# Patient Record
Sex: FEMALE | Race: ASIAN | NOT HISPANIC OR LATINO
[De-identification: names, ages, dates, MRNs, and addresses within clinical notes are randomized per-mention and may not be internally consistent; named-entity substitution may affect disease eponyms.]

---

## 2021-01-14 ENCOUNTER — APPOINTMENT (OUTPATIENT)
Dept: RHEUMATOLOGY | Facility: CLINIC | Age: 61
End: 2021-01-14

## 2021-01-20 ENCOUNTER — APPOINTMENT (OUTPATIENT)
Dept: UROLOGY | Facility: CLINIC | Age: 61
End: 2021-01-20

## 2021-04-06 ENCOUNTER — APPOINTMENT (OUTPATIENT)
Dept: RHEUMATOLOGY | Facility: CLINIC | Age: 61
End: 2021-04-06
Payer: COMMERCIAL

## 2021-04-06 VITALS
WEIGHT: 160.7 LBS | DIASTOLIC BLOOD PRESSURE: 71 MMHG | TEMPERATURE: 98.9 F | SYSTOLIC BLOOD PRESSURE: 110 MMHG | OXYGEN SATURATION: 99 % | BODY MASS INDEX: 28.47 KG/M2 | HEIGHT: 62.8 IN | HEART RATE: 98 BPM

## 2021-04-06 DIAGNOSIS — M25.50 PAIN IN UNSPECIFIED JOINT: ICD-10-CM

## 2021-04-06 DIAGNOSIS — Z87.39 PERSONAL HISTORY OF OTHER DISEASES OF THE MUSCULOSKELETAL SYSTEM AND CONNECTIVE TISSUE: ICD-10-CM

## 2021-04-06 PROCEDURE — 99072 ADDL SUPL MATRL&STAF TM PHE: CPT

## 2021-04-06 PROCEDURE — 36415 COLL VENOUS BLD VENIPUNCTURE: CPT

## 2021-04-06 PROCEDURE — 99204 OFFICE O/P NEW MOD 45 MIN: CPT | Mod: 25

## 2021-04-06 RX ORDER — METFORMIN HYDROCHLORIDE 500 MG/1
500 TABLET, COATED ORAL
Refills: 0 | Status: ACTIVE | COMMUNITY

## 2021-04-06 RX ORDER — AMLODIPINE BESYLATE 5 MG/1
5 TABLET ORAL
Refills: 0 | Status: ACTIVE | COMMUNITY

## 2021-04-06 RX ORDER — GLIPIZIDE 2.5 MG/1
TABLET ORAL
Refills: 0 | Status: ACTIVE | COMMUNITY

## 2021-04-06 RX ORDER — ATORVASTATIN CALCIUM 20 MG/1
20 TABLET, FILM COATED ORAL
Refills: 0 | Status: ACTIVE | COMMUNITY

## 2021-04-06 RX ORDER — ASPIRIN 325 MG/1
TABLET, FILM COATED ORAL
Refills: 0 | Status: ACTIVE | COMMUNITY

## 2021-04-06 RX ORDER — ENALAPRIL MALEATE 10 MG/1
10 TABLET ORAL
Refills: 0 | Status: ACTIVE | COMMUNITY

## 2021-04-06 NOTE — HISTORY OF PRESENT ILLNESS
[FreeTextEntry1] : 59 yo W, presenting for evaluation of joint pain. \par \par \par -diagnosed with RA by PMD based on blood work over 10 years ago \par she initially did not have any pain  except occasionally \par she was seeing an acupuncturist and was managing without medications\par \par \par over te past year\par she started having pain at the wrists and knuckles \par as well as knees and bottom of feet\par the pain was severe affecting her daily activities\par pain is worse in the morning \par associated with morning stiffness for few hours\par reported occasional swelling of the hands\par pain at the feet in the morning with first few steps\par Tylenol helps with pain\par \par -history of remote left ankle fracture \par \par Rheumatologic ROS:\par - No alopecia\par - No dry eyes or mouth\par - No history of red/painful eye\par - No oral ulcers\par - No reflux or dysphagia\par - No Raynaud's \par - No rashes or photosensitivity\par - No miscarriages or pregnancy complications\par - No history of blood clots\par -family history of auto-immune disease: father with RA \par \par \par

## 2021-04-06 NOTE — ASSESSMENT
[FreeTextEntry1] : #Arthralgias of hands/wrists and ankles/feet worsening since last year\par evidence of synovitis on exam concerning for inflammatory arthritis\par #history of RA without treatment\par \par \par Recommend:\par -obtain blood work today to assess for inflammation\par -check RF/CCP as well as screening tests\par -Obtain X-rays of bilateral hands/wrists and ankles/feet\par -obtain MSK US of hands/wrists \par -discussed starting steroids and likely MTX after above\par -flu and COVID vaccine completed \par \par RTO in 2 weeks\par \par \par Patient aware of my assessment and plan, all questions answered.\par

## 2021-04-06 NOTE — PHYSICAL EXAM
[General Appearance - Alert] : alert [General Appearance - In No Acute Distress] : in no acute distress [Sclera] : the sclera and conjunctiva were normal [Respiration, Rhythm And Depth] : normal respiratory rhythm and effort [FreeTextEntry1] : tenderness and synovitis at bilateral wrists, MCPs and PIPs, tenderness to bilateral ankles [Oriented To Time, Place, And Person] : oriented to person, place, and time

## 2021-04-09 ENCOUNTER — OUTPATIENT (OUTPATIENT)
Dept: OUTPATIENT SERVICES | Facility: HOSPITAL | Age: 61
LOS: 1 days | End: 2021-04-09
Payer: COMMERCIAL

## 2021-04-09 ENCOUNTER — APPOINTMENT (OUTPATIENT)
Dept: RADIOLOGY | Facility: IMAGING CENTER | Age: 61
End: 2021-04-09
Payer: COMMERCIAL

## 2021-04-09 ENCOUNTER — RESULT REVIEW (OUTPATIENT)
Age: 61
End: 2021-04-09

## 2021-04-09 DIAGNOSIS — Z87.39 PERSONAL HISTORY OF OTHER DISEASES OF THE MUSCULOSKELETAL SYSTEM AND CONNECTIVE TISSUE: ICD-10-CM

## 2021-04-09 PROCEDURE — 73130 X-RAY EXAM OF HAND: CPT | Mod: 26,50

## 2021-04-09 PROCEDURE — 73110 X-RAY EXAM OF WRIST: CPT

## 2021-04-09 PROCEDURE — 73610 X-RAY EXAM OF ANKLE: CPT | Mod: 26,50

## 2021-04-09 PROCEDURE — 73620 X-RAY EXAM OF FOOT: CPT

## 2021-04-09 PROCEDURE — 73110 X-RAY EXAM OF WRIST: CPT | Mod: 26,50

## 2021-04-09 PROCEDURE — 73130 X-RAY EXAM OF HAND: CPT

## 2021-04-09 PROCEDURE — 73620 X-RAY EXAM OF FOOT: CPT | Mod: 26,50

## 2021-04-09 PROCEDURE — 73610 X-RAY EXAM OF ANKLE: CPT

## 2021-04-15 ENCOUNTER — APPOINTMENT (OUTPATIENT)
Dept: ULTRASOUND IMAGING | Facility: CLINIC | Age: 61
End: 2021-04-15
Payer: COMMERCIAL

## 2021-04-15 ENCOUNTER — OUTPATIENT (OUTPATIENT)
Dept: OUTPATIENT SERVICES | Facility: HOSPITAL | Age: 61
LOS: 1 days | End: 2021-04-15
Payer: COMMERCIAL

## 2021-04-15 ENCOUNTER — RESULT REVIEW (OUTPATIENT)
Age: 61
End: 2021-04-15

## 2021-04-15 DIAGNOSIS — Z87.39 PERSONAL HISTORY OF OTHER DISEASES OF THE MUSCULOSKELETAL SYSTEM AND CONNECTIVE TISSUE: ICD-10-CM

## 2021-04-15 PROCEDURE — 76881 US COMPL JOINT R-T W/IMG: CPT | Mod: 26,RT

## 2021-04-15 PROCEDURE — 76881 US COMPL JOINT R-T W/IMG: CPT

## 2021-04-20 ENCOUNTER — APPOINTMENT (OUTPATIENT)
Dept: RHEUMATOLOGY | Facility: CLINIC | Age: 61
End: 2021-04-20
Payer: COMMERCIAL

## 2021-04-20 VITALS
HEIGHT: 62.8 IN | DIASTOLIC BLOOD PRESSURE: 80 MMHG | BODY MASS INDEX: 28.35 KG/M2 | TEMPERATURE: 98.6 F | OXYGEN SATURATION: 99 % | WEIGHT: 160 LBS | SYSTOLIC BLOOD PRESSURE: 128 MMHG | HEART RATE: 99 BPM

## 2021-04-20 PROCEDURE — 99072 ADDL SUPL MATRL&STAF TM PHE: CPT

## 2021-04-20 PROCEDURE — 99215 OFFICE O/P EST HI 40 MIN: CPT

## 2021-04-20 NOTE — HISTORY OF PRESENT ILLNESS
[FreeTextEntry1] : Initial history:\par \par \par -diagnosed with RA by PMD based on blood work over 10 years ago \par she initially did not have any pain except occasionally \par she was seeing an acupuncturist and was managing without medications\par \par \par over te past year\par she started having pain at the wrists and knuckles \par as well as knees and bottom of feet\par the pain was severe affecting her daily activities\par pain is worse in the morning \par associated with morning stiffness for few hours\par reported occasional swelling of the hands\par pain at the feet in the morning with first few steps\par Tylenol helps with pain\par \par -history of remote left ankle fracture \par \par Rheumatologic ROS:\par - No alopecia\par - No dry eyes or mouth\par - No history of red/painful eye\par - No oral ulcers\par - No reflux or dysphagia\par - No Raynaud's \par - No rashes or photosensitivity\par - No miscarriages or pregnancy complications\par - No history of blood clots\par -family history of auto-immune disease: father with RA \par \par

## 2021-04-20 NOTE — ASSESSMENT
[FreeTextEntry1] : Blood work and X-rays done after last visit reviewed today with patient\par \par #Seropositive erosive RA\par -symptoms present for over 10 years but declined therapy\par -CCP >250, \par -ESR/CRP elevated\par -X-rays hands/wrists 4/9/21: erosions right 4th/left fifth PIP, left 1st MC\par -X-rays feet/ankles 4/9/21: erosions bilateral 5th and right 3rd/4th MT heads, bilateral 5th PP\par \par =Discussed with patient the diagnosis/prognosis/treatment options\par she is now agreeable to start therapy\par risks/benefits/alternatives/side effects reviewed with patient at length\par Printed information from the [employer] provided to patient for review \par discussed the need to start biologics early given the erosive disease \par \par =start prednisone 20 mg daily for one week then 15 mg daily\par =start MTX 10 mg/ weekly\par =start folic acid daily \par \par \par #Screening tests:\par hep panel, QTB negative 4/6/21\par #Vaccination:\par -FLu received 2020\par -needs PCV 13\par -COVID 19 Pfizer 2/27 and 3/20 \par \par #Bone health\par .normal Vitamin D \par .DEXA scan as baseline\par \par #TAYA 1:80\par will complete serology at next visit\par \par RTO in 2 weeks\par \par \par Patient aware of my assessment and plan, all questions answered.\par \par \par

## 2021-04-20 NOTE — PHYSICAL EXAM
[General Appearance - Alert] : alert [General Appearance - In No Acute Distress] : in no acute distress [Sclera] : the sclera and conjunctiva were normal [Auscultation Breath Sounds / Voice Sounds] : lungs were clear to auscultation bilaterally [Heart Sounds] : normal S1 and S2 [FreeTextEntry1] : tenderness and synovitis at bilateral wrists, PIPs, left knee and left ankle [Oriented To Time, Place, And Person] : oriented to person, place, and time

## 2021-04-21 LAB
25(OH)D3 SERPL-MCNC: 55 NG/ML
ALBUMIN SERPL ELPH-MCNC: 4.2 G/DL
ALP BLD-CCNC: 86 U/L
ALT SERPL-CCNC: 24 U/L
ANA PAT FLD IF-IMP: ABNORMAL
ANA PATTERN: ABNORMAL
ANA SER IF-ACNC: ABNORMAL
ANA TITER: ABNORMAL
ANION GAP SERPL CALC-SCNC: 13 MMOL/L
AST SERPL-CCNC: 20 U/L
BASOPHILS # BLD AUTO: 0.03 K/UL
BASOPHILS NFR BLD AUTO: 0.3 %
BILIRUB SERPL-MCNC: 0.2 MG/DL
BUN SERPL-MCNC: 17 MG/DL
CALCIUM SERPL-MCNC: 9.9 MG/DL
CCP AB SER IA-ACNC: >250 UNITS
CHLORIDE SERPL-SCNC: 100 MMOL/L
CO2 SERPL-SCNC: 26 MMOL/L
CREAT SERPL-MCNC: 0.62 MG/DL
CRP SERPL-MCNC: 41 MG/L
ENA SS-A AB SER IA-ACNC: <0.2 AL
ENA SS-B AB SER IA-ACNC: <0.2 AL
EOSINOPHIL # BLD AUTO: 0.24 K/UL
EOSINOPHIL NFR BLD AUTO: 2.7 %
ERYTHROCYTE [SEDIMENTATION RATE] IN BLOOD BY WESTERGREN METHOD: 70 MM/HR
G6PD SER-CCNC: 21.9 U/G HGB
GLUCOSE SERPL-MCNC: 166 MG/DL
HBV CORE IGG+IGM SER QL: NONREACTIVE
HBV SURFACE AG SER QL: NONREACTIVE
HCT VFR BLD CALC: 39.1 %
HCV AB SER QL: NONREACTIVE
HCV S/CO RATIO: 0.11 S/CO
HGB BLD-MCNC: 11.9 G/DL
IMM GRANULOCYTES NFR BLD AUTO: 0.3 %
LYMPHOCYTES # BLD AUTO: 1.41 K/UL
LYMPHOCYTES NFR BLD AUTO: 16 %
M TB IFN-G BLD-IMP: NEGATIVE
MAN DIFF?: NORMAL
MCHC RBC-ENTMCNC: 27.7 PG
MCHC RBC-ENTMCNC: 30.4 GM/DL
MCV RBC AUTO: 90.9 FL
MONOCYTES # BLD AUTO: 0.46 K/UL
MONOCYTES NFR BLD AUTO: 5.2 %
NEUTROPHILS # BLD AUTO: 6.62 K/UL
NEUTROPHILS NFR BLD AUTO: 75.5 %
PLATELET # BLD AUTO: 495 K/UL
POTASSIUM SERPL-SCNC: 5 MMOL/L
PROT SERPL-MCNC: 8.4 G/DL
QUANTIFERON TB PLUS MITOGEN MINUS NIL: 2.22 IU/ML
QUANTIFERON TB PLUS NIL: 0.07 IU/ML
QUANTIFERON TB PLUS TB1 MINUS NIL: 0 IU/ML
QUANTIFERON TB PLUS TB2 MINUS NIL: -0.02 IU/ML
RBC # BLD: 4.3 M/UL
RBC # FLD: 14.6 %
RF+CCP IGG SER-IMP: ABNORMAL
RHEUMATOID FACT SER QL: 172 IU/ML
SODIUM SERPL-SCNC: 139 MMOL/L
WBC # FLD AUTO: 8.79 K/UL

## 2021-05-04 ENCOUNTER — APPOINTMENT (OUTPATIENT)
Dept: RHEUMATOLOGY | Facility: CLINIC | Age: 61
End: 2021-05-04
Payer: COMMERCIAL

## 2021-05-04 VITALS
WEIGHT: 162.46 LBS | HEIGHT: 62.99 IN | SYSTOLIC BLOOD PRESSURE: 127 MMHG | DIASTOLIC BLOOD PRESSURE: 74 MMHG | OXYGEN SATURATION: 99 % | HEART RATE: 84 BPM | TEMPERATURE: 98.4 F | BODY MASS INDEX: 28.79 KG/M2

## 2021-05-04 PROCEDURE — 99072 ADDL SUPL MATRL&STAF TM PHE: CPT

## 2021-05-04 PROCEDURE — 36415 COLL VENOUS BLD VENIPUNCTURE: CPT

## 2021-05-04 PROCEDURE — 99214 OFFICE O/P EST MOD 30 MIN: CPT | Mod: 25

## 2021-05-04 NOTE — HISTORY OF PRESENT ILLNESS
[de-identified] : today's visit  [FreeTextEntry1] : -reports feeling 100% better, the joint pain and swelling have resolved\par -tolerating the medications well no issues

## 2021-05-04 NOTE — ASSESSMENT
[FreeTextEntry1] : #Seropositive erosive RA\par -symptoms present for over 10 years but declined therapy\par -CCP >250, \par -ESR/CRP elevated\par -X-rays hands/wrists 4/9/21: erosions right 4th/left fifth PIP, left 1st MC\par -X-rays feet/ankles 4/9/21: erosions bilateral 5th and right 3rd/4th MT heads, bilateral 5th PP\par ~4/20/21: started prednisone bridge therapy and MTX\par Today in remission \par \par Recommend:\par -obtain monitoring labs today \par -lower prednisone to 10 mg daily \par -increase MTX to 15 mg /week for 2 weeks \par -continue daily folic acid \par \par #Screening tests:\par hep panel, QTB negative 4/6/21\par \par #Vaccination:\par -FLu received 2020\par -needs PCV 13 at next visit \par -COVID 19 Pfizer 2/27 and 3/20 \par \par #Bone health\par .normal Vitamin D \par .DEXA scan as baseline (to schedule)\par \par #TAYA 1:80\par will complete serology today \par \par RTO in 2 weeks\par \par \par Patient aware of my assessment and plan, all questions answered. \par

## 2021-05-05 LAB
ALBUMIN SERPL ELPH-MCNC: 4.2 G/DL
ALP BLD-CCNC: 71 U/L
ALT SERPL-CCNC: 17 U/L
ANION GAP SERPL CALC-SCNC: 15 MMOL/L
APPEARANCE: CLEAR
AST SERPL-CCNC: 18 U/L
B2 GLYCOPROT1 AB SER QL: NEGATIVE
BACTERIA: NEGATIVE
BASOPHILS # BLD AUTO: 0.05 K/UL
BASOPHILS NFR BLD AUTO: 0.4 %
BILIRUB SERPL-MCNC: 0.2 MG/DL
BILIRUBIN URINE: NEGATIVE
BLOOD URINE: NEGATIVE
BUN SERPL-MCNC: 19 MG/DL
C3 SERPL-MCNC: 120 MG/DL
C4 SERPL-MCNC: 26 MG/DL
CALCIUM SERPL-MCNC: 9.2 MG/DL
CHLORIDE SERPL-SCNC: 100 MMOL/L
CO2 SERPL-SCNC: 24 MMOL/L
COLOR: NORMAL
CONFIRM: 26.8 SEC
CREAT SERPL-MCNC: 0.59 MG/DL
CREAT SPEC-SCNC: 78 MG/DL
CREAT/PROT UR: 0.1 RATIO
DRVVT IMM 1:2 NP PPP: NORMAL
DRVVT SCREEN TO CONFIRM RATIO: 0.96 RATIO
DSDNA AB SER-ACNC: 12 IU/ML
EOSINOPHIL # BLD AUTO: 0.1 K/UL
EOSINOPHIL NFR BLD AUTO: 0.7 %
GLUCOSE QUALITATIVE U: NEGATIVE
GLUCOSE SERPL-MCNC: 61 MG/DL
HCT VFR BLD CALC: 37.7 %
HGB BLD-MCNC: 11.6 G/DL
HYALINE CASTS: 1 /LPF
IMM GRANULOCYTES NFR BLD AUTO: 0.4 %
KETONES URINE: NEGATIVE
LEUKOCYTE ESTERASE URINE: NEGATIVE
LYMPHOCYTES # BLD AUTO: 3.39 K/UL
LYMPHOCYTES NFR BLD AUTO: 24.1 %
MAN DIFF?: NORMAL
MCHC RBC-ENTMCNC: 28.4 PG
MCHC RBC-ENTMCNC: 30.8 GM/DL
MCV RBC AUTO: 92.2 FL
MICROSCOPIC-UA: NORMAL
MONOCYTES # BLD AUTO: 0.84 K/UL
MONOCYTES NFR BLD AUTO: 6 %
NEUTROPHILS # BLD AUTO: 9.64 K/UL
NEUTROPHILS NFR BLD AUTO: 68.4 %
NITRITE URINE: NEGATIVE
PH URINE: 5.5
PLATELET # BLD AUTO: 404 K/UL
POTASSIUM SERPL-SCNC: 3.4 MMOL/L
PROT SERPL-MCNC: 7.6 G/DL
PROT UR-MCNC: 7 MG/DL
PROTEIN URINE: NEGATIVE
RBC # BLD: 4.09 M/UL
RBC # FLD: 15.8 %
RED BLOOD CELLS URINE: 4 /HPF
SCREEN DRVVT: 34.7 SEC
SILICA CLOTTING TIME INTERPRETATION: NORMAL
SILICA CLOTTING TIME S/C: 0.93 RATIO
SODIUM SERPL-SCNC: 139 MMOL/L
SPECIFIC GRAVITY URINE: 1.02
SQUAMOUS EPITHELIAL CELLS: 1 /HPF
UROBILINOGEN URINE: NORMAL
WBC # FLD AUTO: 14.08 K/UL
WHITE BLOOD CELLS URINE: 1 /HPF

## 2021-05-06 LAB
ENA RNP AB SER IA-ACNC: 0.3 AL
ENA SM AB SER IA-ACNC: <0.2 AL
ENA SS-A AB SER IA-ACNC: <0.2 AL
ENA SS-B AB SER IA-ACNC: <0.2 AL

## 2021-05-18 ENCOUNTER — APPOINTMENT (OUTPATIENT)
Dept: RHEUMATOLOGY | Facility: CLINIC | Age: 61
End: 2021-05-18
Payer: COMMERCIAL

## 2021-05-18 VITALS
OXYGEN SATURATION: 99 % | DIASTOLIC BLOOD PRESSURE: 84 MMHG | SYSTOLIC BLOOD PRESSURE: 135 MMHG | TEMPERATURE: 98 F | HEART RATE: 71 BPM

## 2021-05-18 PROCEDURE — 36415 COLL VENOUS BLD VENIPUNCTURE: CPT

## 2021-05-18 PROCEDURE — 99072 ADDL SUPL MATRL&STAF TM PHE: CPT

## 2021-05-18 PROCEDURE — 99214 OFFICE O/P EST MOD 30 MIN: CPT | Mod: 25

## 2021-05-18 NOTE — ASSESSMENT
[FreeTextEntry1] : #Seropositive erosive RA\par -symptoms present for over 10 years but declined therapy\par -CCP >250, \par -ESR/CRP elevated\par -X-rays hands/wrists 4/9/21: erosions right 4th/left fifth PIP, left 1st MC\par -X-rays feet/ankles 4/9/21: erosions bilateral 5th and right 3rd/4th MT heads, bilateral 5th PP\par ~4/20/21: started prednisone bridge therapy and MTX\par Today in remission \par Labs done at last visit reviewed today with patient \par \par Recommend:\par -obtain monitoring labs today \par -lower prednisone to 7.5 mg daily \par -increase MTX to 20 mg /week \par -continue daily folic acid \par \par #Screening tests:\par hep panel, QTB negative 4/6/21\par \par #Vaccination:\par -FLu received 2020\par -needs PCV 13 - patient continues to postpone it \par -COVID 19 Pfizer 2/27 and 3/20 \par \par #Bone health\par .normal Vitamin D \par .DEXA scan as baseline (awaiting to be faxed)\par \par #TAYA 1:80\par additional serology negative\par \par RTO in 2-3 weeks\par \par \par Patient aware of my assessment and plan, all questions answered. \par  \par

## 2021-05-18 NOTE — PHYSICAL EXAM
[General Appearance - In No Acute Distress] : in no acute distress [General Appearance - Alert] : alert [Oriented To Time, Place, And Person] : oriented to person, place, and time [FreeTextEntry1] : synovitis at right wrist non tender, no other joint pain or swelling

## 2021-05-19 LAB
ALBUMIN SERPL ELPH-MCNC: 4.2 G/DL
ALP BLD-CCNC: 74 U/L
ALT SERPL-CCNC: 18 U/L
ANION GAP SERPL CALC-SCNC: 13 MMOL/L
AST SERPL-CCNC: 17 U/L
BASOPHILS # BLD AUTO: 0.04 K/UL
BASOPHILS NFR BLD AUTO: 0.3 %
BILIRUB SERPL-MCNC: 0.3 MG/DL
BUN SERPL-MCNC: 16 MG/DL
CALCIUM SERPL-MCNC: 9.7 MG/DL
CHLORIDE SERPL-SCNC: 103 MMOL/L
CO2 SERPL-SCNC: 26 MMOL/L
CREAT SERPL-MCNC: 0.62 MG/DL
CRP SERPL-MCNC: 5 MG/L
EOSINOPHIL # BLD AUTO: 0.1 K/UL
EOSINOPHIL NFR BLD AUTO: 0.7 %
ERYTHROCYTE [SEDIMENTATION RATE] IN BLOOD BY WESTERGREN METHOD: 27 MM/HR
GLUCOSE SERPL-MCNC: 56 MG/DL
HCT VFR BLD CALC: 39.6 %
HGB BLD-MCNC: 11.9 G/DL
IMM GRANULOCYTES NFR BLD AUTO: 0.4 %
LYMPHOCYTES # BLD AUTO: 2.92 K/UL
LYMPHOCYTES NFR BLD AUTO: 21.7 %
MAN DIFF?: NORMAL
MCHC RBC-ENTMCNC: 28.6 PG
MCHC RBC-ENTMCNC: 30.1 GM/DL
MCV RBC AUTO: 95.2 FL
MONOCYTES # BLD AUTO: 0.78 K/UL
MONOCYTES NFR BLD AUTO: 5.8 %
NEUTROPHILS # BLD AUTO: 9.55 K/UL
NEUTROPHILS NFR BLD AUTO: 71.1 %
PLATELET # BLD AUTO: 361 K/UL
POTASSIUM SERPL-SCNC: 4 MMOL/L
PROT SERPL-MCNC: 7.4 G/DL
RBC # BLD: 4.16 M/UL
RBC # FLD: 17.2 %
SODIUM SERPL-SCNC: 143 MMOL/L
WBC # FLD AUTO: 13.44 K/UL

## 2021-06-15 ENCOUNTER — APPOINTMENT (OUTPATIENT)
Dept: RHEUMATOLOGY | Facility: CLINIC | Age: 61
End: 2021-06-15
Payer: COMMERCIAL

## 2021-06-15 VITALS
DIASTOLIC BLOOD PRESSURE: 85 MMHG | HEART RATE: 74 BPM | HEIGHT: 62.99 IN | BODY MASS INDEX: 28.43 KG/M2 | OXYGEN SATURATION: 97 % | WEIGHT: 160.47 LBS | SYSTOLIC BLOOD PRESSURE: 142 MMHG

## 2021-06-15 PROCEDURE — 36415 COLL VENOUS BLD VENIPUNCTURE: CPT

## 2021-06-15 PROCEDURE — 99213 OFFICE O/P EST LOW 20 MIN: CPT | Mod: 25

## 2021-06-15 NOTE — PHYSICAL EXAM
[General Appearance - Alert] : alert [General Appearance - In No Acute Distress] : in no acute distress [Respiration, Rhythm And Depth] : normal respiratory rhythm and effort [Sclera] : the sclera and conjunctiva were normal [Musculoskeletal - Swelling] : no joint swelling seen [FreeTextEntry1] : Tenderness to right ankle, no evidence of synovitis preserved range of motion, rest of joint exam normal [Oriented To Time, Place, And Person] : oriented to person, place, and time

## 2021-06-15 NOTE — ASSESSMENT
[FreeTextEntry1] : #Seropositive erosive RA\par -symptoms present for over 10 years but declined therapy\par -CCP >250, \par -ESR/CRP elevated\par -X-rays hands/wrists 4/9/21: erosions right 4th/left fifth PIP, left 1st MC\par -X-rays feet/ankles 4/9/21: erosions bilateral 5th and right 3rd/4th MT heads, bilateral 5th PP\par ~4/20/21: started prednisone bridge therapy and MTX\par \par Today in remission, CDI 6 low disease activity\par Labs done at last visit reviewed today with patient \par \par Recommend:\par -obtain monitoring labs today \par -lower prednisone to 5 mg daily for 2 weeks then 2.5 mg daily for 2 weeks then stop \par -continue MTX to 20 mg /week \par -continue daily folic acid \par \par #Screening tests:\par hep panel, QTB negative 4/6/21\par \par #Vaccination:\par -FLu received 2020\par -needs PCV 13 - patient continues to ask to postpone it for next visit \par -COVID 19 Pfizer 2/27 and 3/20 \par \par #Bone health\par .normal Vitamin D \par .DEXA scan 5/14/21: lowest T score -1.8 femoral neck\par \par #TAYA 1:80\par additional serology negative\par \par RTO in 4-5 weeks\par \par \par Patient aware of my assessment and plan, all questions answered. \par  \par

## 2021-06-15 NOTE — HISTORY OF PRESENT ILLNESS
[___ Week(s) Ago] : [unfilled] week(s) ago [de-identified] : Today's visit 6/15/21 [FreeTextEntry1] : Has been taking the below medications since last visit\par -prednisone 7.5 mg daily \par -MTX 20 mg weekly \par -She reports feeling significantly better since starting treatment, she denies any episodes of joint pain or swelling or morning stiffness except for new onset pain in the right ankle without associated swelling or redness\par -She is tolerating medications well, no new complaints

## 2021-06-16 LAB
ALBUMIN SERPL ELPH-MCNC: 4.5 G/DL
ALP BLD-CCNC: 70 U/L
ALT SERPL-CCNC: 15 U/L
ANION GAP SERPL CALC-SCNC: 12 MMOL/L
AST SERPL-CCNC: 16 U/L
BASOPHILS # BLD AUTO: 0.03 K/UL
BASOPHILS NFR BLD AUTO: 0.2 %
BILIRUB SERPL-MCNC: 0.3 MG/DL
BUN SERPL-MCNC: 15 MG/DL
CALCIUM SERPL-MCNC: 9.6 MG/DL
CHLORIDE SERPL-SCNC: 104 MMOL/L
CO2 SERPL-SCNC: 27 MMOL/L
CREAT SERPL-MCNC: 0.61 MG/DL
CRP SERPL-MCNC: <3 MG/L
EOSINOPHIL # BLD AUTO: 0.12 K/UL
EOSINOPHIL NFR BLD AUTO: 1 %
ERYTHROCYTE [SEDIMENTATION RATE] IN BLOOD BY WESTERGREN METHOD: 18 MM/HR
GLUCOSE SERPL-MCNC: 60 MG/DL
HCT VFR BLD CALC: 40 %
HGB BLD-MCNC: 12 G/DL
IMM GRANULOCYTES NFR BLD AUTO: 0.2 %
LYMPHOCYTES # BLD AUTO: 3.49 K/UL
LYMPHOCYTES NFR BLD AUTO: 27.7 %
MAN DIFF?: NORMAL
MCHC RBC-ENTMCNC: 28.7 PG
MCHC RBC-ENTMCNC: 30 GM/DL
MCV RBC AUTO: 95.7 FL
MONOCYTES # BLD AUTO: 0.72 K/UL
MONOCYTES NFR BLD AUTO: 5.7 %
NEUTROPHILS # BLD AUTO: 8.21 K/UL
NEUTROPHILS NFR BLD AUTO: 65.2 %
PLATELET # BLD AUTO: 368 K/UL
POTASSIUM SERPL-SCNC: 4.4 MMOL/L
PROT SERPL-MCNC: 7.5 G/DL
RBC # BLD: 4.18 M/UL
RBC # FLD: 17.8 %
SODIUM SERPL-SCNC: 143 MMOL/L
WBC # FLD AUTO: 12.6 K/UL

## 2021-06-18 ENCOUNTER — NON-APPOINTMENT (OUTPATIENT)
Age: 61
End: 2021-06-18

## 2021-07-20 ENCOUNTER — APPOINTMENT (OUTPATIENT)
Dept: RHEUMATOLOGY | Facility: CLINIC | Age: 61
End: 2021-07-20
Payer: COMMERCIAL

## 2021-07-20 VITALS
TEMPERATURE: 97.5 F | HEIGHT: 63.58 IN | SYSTOLIC BLOOD PRESSURE: 117 MMHG | HEART RATE: 73 BPM | OXYGEN SATURATION: 96 % | WEIGHT: 158.93 LBS | DIASTOLIC BLOOD PRESSURE: 69 MMHG | BODY MASS INDEX: 27.81 KG/M2

## 2021-07-20 PROCEDURE — 99214 OFFICE O/P EST MOD 30 MIN: CPT

## 2021-07-20 RX ORDER — PREDNISONE 2.5 MG/1
2.5 TABLET ORAL
Qty: 50 | Refills: 0 | Status: COMPLETED | COMMUNITY
Start: 2021-04-20 | End: 2021-07-20

## 2021-07-20 NOTE — HISTORY OF PRESENT ILLNESS
[de-identified] : today's visit 7/20/21 [FreeTextEntry1] : -Tapered prednisone off as instructed, last taken a week ago -she has not had any recurrence of joint pain or joint swelling, feels well overall\par -Tolerating methotrexate without any issues

## 2021-07-20 NOTE — ASSESSMENT
[FreeTextEntry1] : #Seropositive erosive RA\par -symptoms present for over 10 years but declined therapy\par -CCP >250, \par -ESR/CRP elevated\par -X-rays hands/wrists 4/9/21: erosions right 4th/left fifth PIP, left 1st MC\par -X-rays feet/ankles 4/9/21: erosions bilateral 5th and right 3rd/4th MT heads, bilateral 5th PP\par ~4/20/21: started prednisone bridge therapy and MTX\par \par Today in remission\par Labs done at last visit reviewed today with patient \par \par Recommend:\par -obtain monitoring labs today \par -continue off prednisone \par -continue MTX 20 mg /week \par -continue daily folic acid \par \par #Screening tests:\par hep panel, QTB negative 4/6/21\par \par #Vaccination:\par -FLu received 2020\par -needs PCV 13 - patient continues to ask to postpone it for next visit \par -COVID 19 Pfizer 2/27 and 3/20 \par \par #Bone health\par .normal Vitamin D \par .DEXA scan 5/14/21: lowest T score -1.8 femoral neck\par \par #TAYA 1:80\par additional serology negative\par \par Repeat labs Mid October (ordered and referral provided to patient today)\par \par Patient aware of my assessment and plan, all questions answered. \par  \par

## 2021-07-20 NOTE — PHYSICAL EXAM
[General Appearance - Alert] : alert [General Appearance - In No Acute Distress] : in no acute distress [Sclera] : the sclera and conjunctiva were normal [FreeTextEntry1] : Non tender, non swollen joints. Preserved ROM at tested joints:wrists/MCPs/PIPs/DIPs/elbows/shoulders/knees/hips/ankles/MTPs bilaterally  [Oriented To Time, Place, And Person] : oriented to person, place, and time

## 2021-07-21 LAB
ALBUMIN SERPL ELPH-MCNC: 4.6 G/DL
ALP BLD-CCNC: 75 U/L
ALT SERPL-CCNC: 26 U/L
ANION GAP SERPL CALC-SCNC: 17 MMOL/L
AST SERPL-CCNC: 31 U/L
BASOPHILS # BLD AUTO: 0.02 K/UL
BASOPHILS NFR BLD AUTO: 0.3 %
BILIRUB SERPL-MCNC: 0.3 MG/DL
BUN SERPL-MCNC: 18 MG/DL
CALCIUM SERPL-MCNC: 10.1 MG/DL
CHLORIDE SERPL-SCNC: 104 MMOL/L
CO2 SERPL-SCNC: 22 MMOL/L
CREAT SERPL-MCNC: 0.6 MG/DL
CRP SERPL-MCNC: 11 MG/L
EOSINOPHIL # BLD AUTO: 0.12 K/UL
EOSINOPHIL NFR BLD AUTO: 1.6 %
ERYTHROCYTE [SEDIMENTATION RATE] IN BLOOD BY WESTERGREN METHOD: 37 MM/HR
GLUCOSE SERPL-MCNC: 66 MG/DL
HCT VFR BLD CALC: 41.5 %
HGB BLD-MCNC: 12.9 G/DL
IMM GRANULOCYTES NFR BLD AUTO: 0.3 %
LYMPHOCYTES # BLD AUTO: 1.5 K/UL
LYMPHOCYTES NFR BLD AUTO: 19.6 %
MAN DIFF?: NORMAL
MCHC RBC-ENTMCNC: 29.8 PG
MCHC RBC-ENTMCNC: 31.1 GM/DL
MCV RBC AUTO: 95.8 FL
MONOCYTES # BLD AUTO: 0.48 K/UL
MONOCYTES NFR BLD AUTO: 6.3 %
NEUTROPHILS # BLD AUTO: 5.5 K/UL
NEUTROPHILS NFR BLD AUTO: 71.9 %
PLATELET # BLD AUTO: 356 K/UL
POTASSIUM SERPL-SCNC: 4.4 MMOL/L
PROT SERPL-MCNC: 7.6 G/DL
RBC # BLD: 4.33 M/UL
RBC # FLD: 16.3 %
SODIUM SERPL-SCNC: 142 MMOL/L
WBC # FLD AUTO: 7.64 K/UL

## 2021-12-07 ENCOUNTER — APPOINTMENT (OUTPATIENT)
Dept: RHEUMATOLOGY | Facility: CLINIC | Age: 61
End: 2021-12-07
Payer: COMMERCIAL

## 2021-12-07 VITALS
HEART RATE: 75 BPM | DIASTOLIC BLOOD PRESSURE: 73 MMHG | BODY MASS INDEX: 28.73 KG/M2 | SYSTOLIC BLOOD PRESSURE: 137 MMHG | TEMPERATURE: 96.8 F | WEIGHT: 162.15 LBS | OXYGEN SATURATION: 98 % | HEIGHT: 63.11 IN

## 2021-12-07 LAB
ALBUMIN SERPL ELPH-MCNC: 4.5 G/DL
ALP BLD-CCNC: 81 U/L
ALT SERPL-CCNC: 24 U/L
ANION GAP SERPL CALC-SCNC: 14 MMOL/L
AST SERPL-CCNC: 19 U/L
BASOPHILS # BLD AUTO: 0.03 K/UL
BASOPHILS NFR BLD AUTO: 0.4 %
BILIRUB SERPL-MCNC: 0.2 MG/DL
BUN SERPL-MCNC: 13 MG/DL
CALCIUM SERPL-MCNC: 9.7 MG/DL
CHLORIDE SERPL-SCNC: 104 MMOL/L
CO2 SERPL-SCNC: 24 MMOL/L
CREAT SERPL-MCNC: 0.68 MG/DL
CRP SERPL-MCNC: <3 MG/L
EOSINOPHIL # BLD AUTO: 0.11 K/UL
EOSINOPHIL NFR BLD AUTO: 1.5 %
ERYTHROCYTE [SEDIMENTATION RATE] IN BLOOD BY WESTERGREN METHOD: 17 MM/HR
GLUCOSE SERPL-MCNC: 138 MG/DL
HCT VFR BLD CALC: 37.8 %
HGB BLD-MCNC: 12 G/DL
IMM GRANULOCYTES NFR BLD AUTO: 0.3 %
LYMPHOCYTES # BLD AUTO: 1.5 K/UL
LYMPHOCYTES NFR BLD AUTO: 20.1 %
MAN DIFF?: NORMAL
MCHC RBC-ENTMCNC: 31 PG
MCHC RBC-ENTMCNC: 31.7 GM/DL
MCV RBC AUTO: 97.7 FL
MONOCYTES # BLD AUTO: 0.46 K/UL
MONOCYTES NFR BLD AUTO: 6.2 %
NEUTROPHILS # BLD AUTO: 5.35 K/UL
NEUTROPHILS NFR BLD AUTO: 71.5 %
PLATELET # BLD AUTO: 338 K/UL
POTASSIUM SERPL-SCNC: 4.2 MMOL/L
PROT SERPL-MCNC: 7.3 G/DL
RBC # BLD: 3.87 M/UL
RBC # FLD: 14.4 %
SODIUM SERPL-SCNC: 142 MMOL/L
WBC # FLD AUTO: 7.47 K/UL

## 2021-12-07 PROCEDURE — 99214 OFFICE O/P EST MOD 30 MIN: CPT

## 2021-12-07 NOTE — HISTORY OF PRESENT ILLNESS
[de-identified] : Since last visit [FreeTextEntry1] : -She did not get the blood work done in October as requested\par Was done yesterday\par -She feels well overall, denies any recurrence of joint pain or joint swelling, no morning stiffness\par -Taking medication as prescribed, no issues\par -Completed vaccination: Flu and covid booster\par Reports that she took PCV13 in the summer\par

## 2021-12-07 NOTE — PHYSICAL EXAM
[General Appearance - Alert] : alert [General Appearance - In No Acute Distress] : in no acute distress [Sclera] : the sclera and conjunctiva were normal [Auscultation Breath Sounds / Voice Sounds] : lungs were clear to auscultation bilaterally [Heart Sounds] : normal S1 and S2 [Musculoskeletal - Swelling] : no joint swelling seen [Oriented To Time, Place, And Person] : oriented to person, place, and time

## 2022-01-24 ENCOUNTER — APPOINTMENT (OUTPATIENT)
Dept: RADIOLOGY | Facility: CLINIC | Age: 62
End: 2022-01-24
Payer: COMMERCIAL

## 2022-01-24 ENCOUNTER — RESULT REVIEW (OUTPATIENT)
Age: 62
End: 2022-01-24

## 2022-01-24 ENCOUNTER — OUTPATIENT (OUTPATIENT)
Dept: OUTPATIENT SERVICES | Facility: HOSPITAL | Age: 62
LOS: 1 days | End: 2022-01-24
Payer: COMMERCIAL

## 2022-01-24 DIAGNOSIS — Z00.8 ENCOUNTER FOR OTHER GENERAL EXAMINATION: ICD-10-CM

## 2022-01-24 PROCEDURE — 73130 X-RAY EXAM OF HAND: CPT

## 2022-01-24 PROCEDURE — 73630 X-RAY EXAM OF FOOT: CPT | Mod: 26,50

## 2022-01-24 PROCEDURE — 73630 X-RAY EXAM OF FOOT: CPT

## 2022-01-24 PROCEDURE — 73130 X-RAY EXAM OF HAND: CPT | Mod: 26,50

## 2022-02-01 ENCOUNTER — APPOINTMENT (OUTPATIENT)
Dept: RHEUMATOLOGY | Facility: CLINIC | Age: 62
End: 2022-02-01
Payer: COMMERCIAL

## 2022-02-01 VITALS
OXYGEN SATURATION: 98 % | HEART RATE: 73 BPM | TEMPERATURE: 98 F | HEIGHT: 63.39 IN | SYSTOLIC BLOOD PRESSURE: 132 MMHG | WEIGHT: 159.82 LBS | DIASTOLIC BLOOD PRESSURE: 77 MMHG | BODY MASS INDEX: 27.97 KG/M2

## 2022-02-01 PROCEDURE — 99213 OFFICE O/P EST LOW 20 MIN: CPT

## 2022-02-01 NOTE — ASSESSMENT
[FreeTextEntry1] : #Seropositive erosive RA\par -symptoms present for over 10 years but declined therapy\par -CCP >250, \par -ESR/CRP elevated\par -X-rays hands/wrists 4/9/21: erosions right 4th/left fifth PIP, left 1st MC\par -X-rays feet/ankles 4/9/21: erosions bilateral 5th and right 3rd/4th MT heads, bilateral 5th PP\par ~4/20/21: started prednisone bridge therapy and MTX\par \par Today in low disease activity/remission\par Repeat X-rays hands/feet Jan 2022: no new erosions \par \par Recommend:\par -We discussed today that given the erosive nature of her disease she would benefit from stepping up therapy and introducing a biologic\par Discussed the different types of medications, printed information from ACR was provided at last visit \par At this time, patient remains hesitant and would like to hold off\par \par -continue MTX 20 mg /week \par -continue daily folic acid \par -continue off prednisone \par \par #Screening tests:\par hep panel, QTB negative 4/6/21\par \par #Vaccination:\par -FLu received 2021\par -Received PCV 13 with PMD- advised patient to provide documentation \par -COVID 19 Pfizer 2/27 and 3/20 , booster received November 2021\par \par #Bone health\par .normal Vitamin D \par .DEXA scan 5/14/21: lowest T score -1.8 femoral neck\par \par #TAYA 1:80\par additional serology negative\par \par RTO in March\par \par Patient aware of my assessment and plan, all questions answered.\par \par

## 2022-02-01 NOTE — HISTORY OF PRESENT ILLNESS
[de-identified] : Today's visit  [FreeTextEntry1] : She feels well overall, denies any joint pain or joint swelling, no morning stiffness\par Taking medication as prescribed, no issues or side effects reported\par She has reviewed the printed information about new therapy as given at last visit, she would like to hold off at this time

## 2022-02-09 ENCOUNTER — RX RENEWAL (OUTPATIENT)
Age: 62
End: 2022-02-09

## 2022-03-22 ENCOUNTER — APPOINTMENT (OUTPATIENT)
Dept: RHEUMATOLOGY | Facility: CLINIC | Age: 62
End: 2022-03-22

## 2022-04-05 ENCOUNTER — LABORATORY RESULT (OUTPATIENT)
Age: 62
End: 2022-04-05

## 2022-04-05 ENCOUNTER — APPOINTMENT (OUTPATIENT)
Dept: RHEUMATOLOGY | Facility: CLINIC | Age: 62
End: 2022-04-05
Payer: COMMERCIAL

## 2022-04-05 VITALS
HEART RATE: 72 BPM | SYSTOLIC BLOOD PRESSURE: 158 MMHG | OXYGEN SATURATION: 96 % | WEIGHT: 160.6 LBS | BODY MASS INDEX: 26.44 KG/M2 | DIASTOLIC BLOOD PRESSURE: 87 MMHG | HEIGHT: 65.39 IN | TEMPERATURE: 97.3 F

## 2022-04-05 PROCEDURE — 36415 COLL VENOUS BLD VENIPUNCTURE: CPT

## 2022-04-05 PROCEDURE — 99214 OFFICE O/P EST MOD 30 MIN: CPT | Mod: 25

## 2022-04-05 NOTE — REVIEW OF SYSTEMS
[Fever] : no fever [Chills] : no chills [As Noted in HPI] : as noted in HPI [Negative] : Integumentary

## 2022-04-05 NOTE — ASSESSMENT
[FreeTextEntry1] : #Seropositive erosive RA\par -symptoms present for over 10 years but declined therapy\par -CCP >250, \par -ESR/CRP elevated\par -X-rays hands/wrists 4/9/21: erosions right 4th/left fifth PIP, left 1st MC\par -X-rays feet/ankles 4/9/21: erosions bilateral 5th and right 3rd/4th MT heads, bilateral 5th PP\par ~4/20/21: started prednisone bridge therapy and MTX\par Repeat X-rays hands/feet Jan 2022: no new erosions \par Today in low disease activity/remission\par \par Recommend:\par -Given the erosive nature of her disease she would benefit from stepping up therapy and introducing a biologic\par Discussed the different types of medications, printed information from ACR was provided at last visit \par At this time, patient remains hesitant and would like to hold off\par \par -continue MTX 20 mg /week \par -continue daily folic acid \par \par \par #Screening tests:\par hep panel, QTB negative 4/6/21- ordered today \par \par #Vaccination:\par -FLu received 2021\par -Received PCV 13 with PMD- advised patient to provide documentation \par -COVID 19 Pfizer 2/27 and 3/20 , booster received November 2021-due for second booster dose\par \par #Bone health\par .normal Vitamin D \par .DEXA scan 5/14/21: lowest T score -1.8 femoral neck\par \par #TAYA 1:80\par additional serology negative\par repeat today \par \par Follow-up in 3 to 4 months or earlier if needed\par \par

## 2022-04-05 NOTE — PHYSICAL EXAM
[General Appearance - Alert] : alert [General Appearance - In No Acute Distress] : in no acute distress [Sclera] : the sclera and conjunctiva were normal [Auscultation Breath Sounds / Voice Sounds] : lungs were clear to auscultation bilaterally [Heart Sounds] : normal S1 and S2 [Musculoskeletal - Swelling] : no joint swelling seen [FreeTextEntry1] : Nontender joints [] : no rash [Oriented To Time, Place, And Person] : oriented to person, place, and time

## 2022-04-05 NOTE — HISTORY OF PRESENT ILLNESS
[de-identified] : last visit 2/1/22, at today's visit :  [FreeTextEntry1] : Feels well overall, no recurrence of joint pain/swelling or morning stiffness\par Ran out of methotrexate for the past month, was not able to obtain refills\par She is under a lot of stress in her family at this time, is not ready to make a decision regarding biologic therapy for her RA

## 2022-04-06 LAB
ALBUMIN SERPL ELPH-MCNC: 4.8 G/DL
ALP BLD-CCNC: 88 U/L
ALT SERPL-CCNC: 20 U/L
ANION GAP SERPL CALC-SCNC: 13 MMOL/L
APPEARANCE: CLEAR
AST SERPL-CCNC: 22 U/L
BACTERIA: NEGATIVE
BASOPHILS # BLD AUTO: 0.03 K/UL
BASOPHILS NFR BLD AUTO: 0.4 %
BILIRUB SERPL-MCNC: 0.3 MG/DL
BILIRUBIN URINE: NEGATIVE
BLOOD URINE: NEGATIVE
BUN SERPL-MCNC: 18 MG/DL
C3 SERPL-MCNC: 155 MG/DL
C4 SERPL-MCNC: 39 MG/DL
CALCIUM OXALATE CRYSTALS: ABNORMAL
CALCIUM SERPL-MCNC: 10.2 MG/DL
CHLORIDE SERPL-SCNC: 103 MMOL/L
CO2 SERPL-SCNC: 26 MMOL/L
COLOR: YELLOW
CONFIRM: 25.3 SEC
CREAT SERPL-MCNC: 0.63 MG/DL
CREAT SPEC-SCNC: 112 MG/DL
CREAT/PROT UR: 0.2 RATIO
CRP SERPL-MCNC: <3 MG/L
DRVVT IMM 1:2 NP PPP: NORMAL
DRVVT SCREEN TO CONFIRM RATIO: 0.87 RATIO
EGFR: 100 ML/MIN/1.73M2
EOSINOPHIL # BLD AUTO: 0.1 K/UL
EOSINOPHIL NFR BLD AUTO: 1.2 %
ERYTHROCYTE [SEDIMENTATION RATE] IN BLOOD BY WESTERGREN METHOD: 41 MM/HR
GLUCOSE QUALITATIVE U: NEGATIVE
GLUCOSE SERPL-MCNC: 86 MG/DL
HBV CORE IGG+IGM SER QL: NONREACTIVE
HBV SURFACE AG SER QL: NONREACTIVE
HCT VFR BLD CALC: 43.3 %
HCV AB SER QL: NONREACTIVE
HCV S/CO RATIO: 0.13 S/CO
HGB BLD-MCNC: 13.7 G/DL
HYALINE CASTS: 0 /LPF
IMM GRANULOCYTES NFR BLD AUTO: 0.2 %
KETONES URINE: NEGATIVE
LEUKOCYTE ESTERASE URINE: NEGATIVE
LYMPHOCYTES # BLD AUTO: 2.2 K/UL
LYMPHOCYTES NFR BLD AUTO: 27.1 %
MAN DIFF?: NORMAL
MCHC RBC-ENTMCNC: 30 PG
MCHC RBC-ENTMCNC: 31.6 GM/DL
MCV RBC AUTO: 94.7 FL
MICROSCOPIC-UA: NORMAL
MONOCYTES # BLD AUTO: 0.42 K/UL
MONOCYTES NFR BLD AUTO: 5.2 %
NEUTROPHILS # BLD AUTO: 5.34 K/UL
NEUTROPHILS NFR BLD AUTO: 65.9 %
NITRITE URINE: NEGATIVE
PH URINE: 5.5
PLATELET # BLD AUTO: 337 K/UL
POTASSIUM SERPL-SCNC: 4.5 MMOL/L
PROT SERPL-MCNC: 8.2 G/DL
PROT UR-MCNC: 19 MG/DL
PROTEIN URINE: NORMAL
RBC # BLD: 4.57 M/UL
RBC # FLD: 13.1 %
RED BLOOD CELLS URINE: 1 /HPF
SCREEN DRVVT: 26.3 SEC
SILICA CLOTTING TIME INTERPRETATION: NORMAL
SILICA CLOTTING TIME S/C: 0.97 RATIO
SODIUM SERPL-SCNC: 142 MMOL/L
SPECIFIC GRAVITY URINE: 1.02
SQUAMOUS EPITHELIAL CELLS: 2 /HPF
UROBILINOGEN URINE: NORMAL
WBC # FLD AUTO: 8.11 K/UL
WHITE BLOOD CELLS URINE: 1 /HPF

## 2022-04-07 LAB
B2 GLYCOPROT1 AB SER QL: POSITIVE
CARDIOLIPIN AB SER IA-ACNC: NEGATIVE
DSDNA AB SER-ACNC: <12 IU/ML
ENA RNP AB SER IA-ACNC: 0.3 AL
ENA SM AB SER IA-ACNC: <0.2 AL

## 2022-04-08 LAB — ANA SER IF-ACNC: NEGATIVE

## 2022-05-31 ENCOUNTER — APPOINTMENT (OUTPATIENT)
Dept: RHEUMATOLOGY | Facility: CLINIC | Age: 62
End: 2022-05-31
Payer: COMMERCIAL

## 2022-05-31 VITALS
OXYGEN SATURATION: 97 % | DIASTOLIC BLOOD PRESSURE: 70 MMHG | HEIGHT: 65 IN | SYSTOLIC BLOOD PRESSURE: 108 MMHG | HEART RATE: 81 BPM | WEIGHT: 180 LBS | TEMPERATURE: 98.3 F | BODY MASS INDEX: 29.99 KG/M2

## 2022-05-31 DIAGNOSIS — R76.8 OTHER SPECIFIED ABNORMAL IMMUNOLOGICAL FINDINGS IN SERUM: ICD-10-CM

## 2022-05-31 PROCEDURE — 99214 OFFICE O/P EST MOD 30 MIN: CPT

## 2022-05-31 NOTE — PHYSICAL EXAM
[General Appearance - Alert] : alert [General Appearance - In No Acute Distress] : in no acute distress [Musculoskeletal - Swelling] : no joint swelling seen [FreeTextEntry1] : tenderness to right second MCP  [] : no rash [Oriented To Time, Place, And Person] : oriented to person, place, and time

## 2022-05-31 NOTE — ASSESSMENT
[FreeTextEntry1] : #Seropositive erosive RA\par -symptoms present for over 10 years but declined therapy\par -CCP >250, \par -ESR/CRP elevated\par -X-rays hands/wrists 4/9/21: erosions right 4th/left fifth PIP, left 1st MC\par -X-rays feet/ankles 4/9/21: erosions bilateral 5th and right 3rd/4th MT heads, bilateral 5th PP\par ~4/20/21: started prednisone bridge therapy and MTX\par Repeat X-rays hands/feet Jan 2022: no new erosions \par Today in low disease activity/remission\par \par Recommend:\par -Given the erosive nature of her disease she would benefit from stepping up therapy and introducing a biologic\par Discussed the different types of medications, printed information from ACR was provided at last visit \par At this time, patient remains hesitant and would like to hold off\par \par -continue MTX 20 mg /week \par -continue daily folic acid \par \par \par #Screening tests:\par hep panel negative 4/2022\par QTB not done- reordered for today \par \par #Vaccination:\par -FLu received 2021\par -Received PCV 13 with PMD- advised patient to provide documentation \par -COVID 19 Pfizer 2/27 and 3/20 , booster received November 2021- due for second booster dose\par \par #Bone health\par .normal Vitamin D \par .DEXA scan 5/14/21: lowest T score -1.8 femoral neck\par \par #TAYA 1:80\par repeat negative\par \par Follow-up in 3 to 4 months or earlier if needed\par \par

## 2022-05-31 NOTE — HISTORY OF PRESENT ILLNESS
[FreeTextEntry1] : Initial history:\par \par \par -diagnosed with RA by PMD based on blood work over 10 years ago \par she initially did not have any pain except occasionally \par she was seeing an acupuncturist and was managing without medications\par \par \par over te past year\par she started having pain at the wrists and knuckles \par as well as knees and bottom of feet\par the pain was severe affecting her daily activities\par pain is worse in the morning \par associated with morning stiffness for few hours\par reported occasional swelling of the hands\par pain at the feet in the morning with first few steps\par Tylenol helps with pain\par \par -history of remote left ankle fracture \par \par Rheumatologic ROS:\par - No alopecia\par - No dry eyes or mouth\par - No history of red/painful eye\par - No oral ulcers\par - No reflux or dysphagia\par - No Raynaud's \par - No rashes or photosensitivity\par - No miscarriages or pregnancy complications\par - No history of blood clots\par -family history of auto-immune disease: father with RA \par \par Interval events: 4/20/21\par ----------------------------\par -started prednisone\par -started MTX + daily folic acid\par \par \par \par Last visit 4/5/22,\par Feels well overall, no recurrence of joint pain/swelling or morning stiffness\par Ran out of methotrexate for the past month, was not able to obtain refills\par She is under a lot of stress in her family at this time, is not ready to make a decision regarding biologic therapy for her RA \par  [de-identified] : today's visit

## 2022-06-01 LAB
ALBUMIN SERPL ELPH-MCNC: 4.5 G/DL
ALP BLD-CCNC: 80 U/L
ALT SERPL-CCNC: 16 U/L
ANION GAP SERPL CALC-SCNC: 14 MMOL/L
AST SERPL-CCNC: 19 U/L
BASOPHILS # BLD AUTO: 0.02 K/UL
BASOPHILS NFR BLD AUTO: 0.3 %
BILIRUB SERPL-MCNC: 0.3 MG/DL
BUN SERPL-MCNC: 17 MG/DL
CALCIUM SERPL-MCNC: 9.4 MG/DL
CHLORIDE SERPL-SCNC: 103 MMOL/L
CO2 SERPL-SCNC: 24 MMOL/L
CREAT SERPL-MCNC: 0.64 MG/DL
CRP SERPL-MCNC: 4 MG/L
EGFR: 100 ML/MIN/1.73M2
EOSINOPHIL # BLD AUTO: 0.04 K/UL
EOSINOPHIL NFR BLD AUTO: 0.6 %
ERYTHROCYTE [SEDIMENTATION RATE] IN BLOOD BY WESTERGREN METHOD: 44 MM/HR
GLUCOSE SERPL-MCNC: 105 MG/DL
HCT VFR BLD CALC: 36.5 %
HGB BLD-MCNC: 11.7 G/DL
IMM GRANULOCYTES NFR BLD AUTO: 0.1 %
LYMPHOCYTES # BLD AUTO: 1.47 K/UL
LYMPHOCYTES NFR BLD AUTO: 21.2 %
MAN DIFF?: NORMAL
MCHC RBC-ENTMCNC: 30.1 PG
MCHC RBC-ENTMCNC: 32.1 GM/DL
MCV RBC AUTO: 93.8 FL
MONOCYTES # BLD AUTO: 0.39 K/UL
MONOCYTES NFR BLD AUTO: 5.6 %
NEUTROPHILS # BLD AUTO: 5.01 K/UL
NEUTROPHILS NFR BLD AUTO: 72.2 %
PLATELET # BLD AUTO: 365 K/UL
POTASSIUM SERPL-SCNC: 4.8 MMOL/L
PROT SERPL-MCNC: 7.7 G/DL
RBC # BLD: 3.89 M/UL
RBC # FLD: 14.2 %
SODIUM SERPL-SCNC: 142 MMOL/L
WBC # FLD AUTO: 6.94 K/UL

## 2022-06-27 ENCOUNTER — RX RENEWAL (OUTPATIENT)
Age: 62
End: 2022-06-27

## 2022-07-31 ENCOUNTER — EMERGENCY (EMERGENCY)
Facility: HOSPITAL | Age: 62
LOS: 1 days | Discharge: HOME | End: 2022-07-31
Attending: STUDENT IN AN ORGANIZED HEALTH CARE EDUCATION/TRAINING PROGRAM
Payer: COMMERCIAL

## 2022-07-31 VITALS
HEART RATE: 78 BPM | WEIGHT: 160.06 LBS | DIASTOLIC BLOOD PRESSURE: 92 MMHG | SYSTOLIC BLOOD PRESSURE: 159 MMHG | HEIGHT: 67 IN | OXYGEN SATURATION: 98 % | TEMPERATURE: 98 F | RESPIRATION RATE: 16 BRPM

## 2022-07-31 LAB
ALBUMIN SERPL ELPH-MCNC: 4.1 G/DL — SIGNIFICANT CHANGE UP (ref 3.3–5)
ALP SERPL-CCNC: 75 U/L — SIGNIFICANT CHANGE UP (ref 40–120)
ALT FLD-CCNC: 15 U/L — SIGNIFICANT CHANGE UP (ref 10–45)
ANION GAP SERPL CALC-SCNC: 11 MMOL/L — SIGNIFICANT CHANGE UP (ref 5–17)
AST SERPL-CCNC: 17 U/L — SIGNIFICANT CHANGE UP (ref 10–40)
BASOPHILS # BLD AUTO: 0.03 K/UL — SIGNIFICANT CHANGE UP (ref 0–0.2)
BASOPHILS NFR BLD AUTO: 0.5 % — SIGNIFICANT CHANGE UP (ref 0–2)
BILIRUB SERPL-MCNC: 0.3 MG/DL — SIGNIFICANT CHANGE UP (ref 0.2–1.2)
BUN SERPL-MCNC: 22 MG/DL — SIGNIFICANT CHANGE UP (ref 7–23)
CALCIUM SERPL-MCNC: 9.3 MG/DL — SIGNIFICANT CHANGE UP (ref 8.4–10.5)
CHLORIDE SERPL-SCNC: 105 MMOL/L — SIGNIFICANT CHANGE UP (ref 96–108)
CO2 SERPL-SCNC: 25 MMOL/L — SIGNIFICANT CHANGE UP (ref 22–31)
CREAT SERPL-MCNC: 0.54 MG/DL — SIGNIFICANT CHANGE UP (ref 0.5–1.3)
EGFR: 104 ML/MIN/1.73M2 — SIGNIFICANT CHANGE UP
EOSINOPHIL # BLD AUTO: 0.19 K/UL — SIGNIFICANT CHANGE UP (ref 0–0.5)
EOSINOPHIL NFR BLD AUTO: 2.9 % — SIGNIFICANT CHANGE UP (ref 0–6)
GLUCOSE SERPL-MCNC: 140 MG/DL — HIGH (ref 70–99)
HCT VFR BLD CALC: 36.1 % — SIGNIFICANT CHANGE UP (ref 34.5–45)
HGB BLD-MCNC: 11.8 G/DL — SIGNIFICANT CHANGE UP (ref 11.5–15.5)
IMM GRANULOCYTES NFR BLD AUTO: 0.3 % — SIGNIFICANT CHANGE UP (ref 0–1.5)
LYMPHOCYTES # BLD AUTO: 1.79 K/UL — SIGNIFICANT CHANGE UP (ref 1–3.3)
LYMPHOCYTES # BLD AUTO: 27.6 % — SIGNIFICANT CHANGE UP (ref 13–44)
MCHC RBC-ENTMCNC: 30.7 PG — SIGNIFICANT CHANGE UP (ref 27–34)
MCHC RBC-ENTMCNC: 32.7 GM/DL — SIGNIFICANT CHANGE UP (ref 32–36)
MCV RBC AUTO: 94 FL — SIGNIFICANT CHANGE UP (ref 80–100)
MONOCYTES # BLD AUTO: 0.35 K/UL — SIGNIFICANT CHANGE UP (ref 0–0.9)
MONOCYTES NFR BLD AUTO: 5.4 % — SIGNIFICANT CHANGE UP (ref 2–14)
NEUTROPHILS # BLD AUTO: 4.11 K/UL — SIGNIFICANT CHANGE UP (ref 1.8–7.4)
NEUTROPHILS NFR BLD AUTO: 63.3 % — SIGNIFICANT CHANGE UP (ref 43–77)
NRBC # BLD: 0 /100 WBCS — SIGNIFICANT CHANGE UP (ref 0–0)
PLATELET # BLD AUTO: 354 K/UL — SIGNIFICANT CHANGE UP (ref 150–400)
POTASSIUM SERPL-MCNC: 3.7 MMOL/L — SIGNIFICANT CHANGE UP (ref 3.5–5.3)
POTASSIUM SERPL-SCNC: 3.7 MMOL/L — SIGNIFICANT CHANGE UP (ref 3.5–5.3)
PROT SERPL-MCNC: 7.7 G/DL — SIGNIFICANT CHANGE UP (ref 6–8.3)
RBC # BLD: 3.84 M/UL — SIGNIFICANT CHANGE UP (ref 3.8–5.2)
RBC # FLD: 14.2 % — SIGNIFICANT CHANGE UP (ref 10.3–14.5)
SODIUM SERPL-SCNC: 141 MMOL/L — SIGNIFICANT CHANGE UP (ref 135–145)
WBC # BLD: 6.49 K/UL — SIGNIFICANT CHANGE UP (ref 3.8–10.5)
WBC # FLD AUTO: 6.49 K/UL — SIGNIFICANT CHANGE UP (ref 3.8–10.5)

## 2022-07-31 PROCEDURE — 80053 COMPREHEN METABOLIC PANEL: CPT

## 2022-07-31 PROCEDURE — 99285 EMERGENCY DEPT VISIT HI MDM: CPT

## 2022-07-31 PROCEDURE — 70496 CT ANGIOGRAPHY HEAD: CPT | Mod: MA

## 2022-07-31 PROCEDURE — 70496 CT ANGIOGRAPHY HEAD: CPT | Mod: 26,MA

## 2022-07-31 PROCEDURE — 99284 EMERGENCY DEPT VISIT MOD MDM: CPT | Mod: 25

## 2022-07-31 PROCEDURE — 70450 CT HEAD/BRAIN W/O DYE: CPT | Mod: MA

## 2022-07-31 PROCEDURE — 85025 COMPLETE CBC W/AUTO DIFF WBC: CPT

## 2022-07-31 PROCEDURE — 36415 COLL VENOUS BLD VENIPUNCTURE: CPT

## 2022-07-31 NOTE — ED PROVIDER NOTE - NSFOLLOWUPINSTRUCTIONS_ED_ALL_ED_FT
Discharge instructions:    - Please follow up with your Primary Care Doctor within 3-5 days.    - Please follow up with your Onomatologist within the next 24-72 hours.     - Take any prescribed medications as instructed:         SEEK IMMEDIATE MEDICAL CARE IF YOU HAVE ANY OF THE FOLLOWING SYMPTOMS: fever, vomiting, stiff neck, loss of vision, problems with speech, muscle weakness, loss of balance, trouble walking, passing out, or confusion.

## 2022-07-31 NOTE — ED ADULT NURSE NOTE - OBJECTIVE STATEMENT
60yo F with PMH HTN, HLD, DM, aneurysm, presents to ED c/o dizziness. Pt states, "for the past couple of days I have been feeling off, I had an aneurysm 62yo F with PMH HTN, HLD, DM, aneurysm, presents to ED c/o dizziness. Pt states, "for the past couple of days I have been feeling off, I had an aneurysm about 9 years ago, it doesn't feel like that time, but I am just nervious because of my history". Pt denies any pain at this time. Denies chest pain, SOB, N/V, weakness, lightheadedness, headache, blurred/change in vision, unsteady gait, fevers/chills, sick contacts. A&Ox3. Strong peripheral pulses. Neurologically intact and follows commands, see neuro flow sheet in patient's paper chart. Ambulatory with steady gait in ED. Skin warm, dry, intact, and normal for ethnicity. Friend at bedside. Stretcher locked and in lowest position, appropriate side rails up. Pt instructed to notify RN if assistance is needed.

## 2022-07-31 NOTE — ED PROVIDER NOTE - PATIENT PORTAL LINK FT
You can access the FollowMyHealth Patient Portal offered by [facility] by registering at the following website: [email or web address]. By joining [employer]’s FollowMyHealth portal, you will also be able to view your health information using other applications (apps) compatible with our system.

## 2022-07-31 NOTE — ED PROVIDER NOTE - OBJECTIVE STATEMENT
61 year old female with a PMHx of brain aneurysm rupture, HTN, HLD, diabetes, RA presenting with blurry vision. Patient had transient episode of blurry vision bilaterally for 10 minutes around 7pm. Patient was nervous with her history of ruptured aneurysm and came to the ED for an evaluation. Patient is currently asymptomatic.

## 2022-07-31 NOTE — ED PROVIDER NOTE - CLINICAL SUMMARY MEDICAL DECISION MAKING FREE TEXT BOX
Topher HOLCOMB): 61 year old female with a PMHx of brain aneurysm rupture, HTN, HLD, diabetes, RA presenting with blurry vision. Patient had transient episode of blurry vision bilaterally for 10 minutes around 7pm. Patient was nervous with her history of ruptured aneurysm and came to the ED for an evaluation. Patient is currently asymptomatic. Neuro exam intact. Visual acuity 20/20 bilaterally with corrective lenses used. No focal neuro deficits. Plan for labs, CT/CTA head and neck. Will dispo accordingly.

## 2022-07-31 NOTE — ED ADULT TRIAGE NOTE - CHIEF COMPLAINT QUOTE
Intermittent dizziness and "brain fogginess" x 4 days, BEFAST NEGATIVE  Hx of brain aneurysm with repair 9 years

## 2022-07-31 NOTE — ED PROVIDER NOTE - PHYSICAL EXAMINATION
gen: well appearing  Mentation: AAO x 3  psych: mood appropriate  HEENT: airway patent, conjunctivae clear bilaterally  Cardio: RRR, no m/r/g  Resp: normal BS b/l  GI: soft/nondistended/nontender  : no CVA tenderness  Neuro: sensation and motor function grossly intact  Skin: No evidence of rash  MSK: normal movement of all extremities  Lymph/Vasc: no LE edema

## 2022-07-31 NOTE — ED PROVIDER NOTE - ATTENDING CONTRIBUTION TO CARE
Attending (Mayela Obando M.D.):  I have personally seen and examined this patient. I have performed a substantive portion of the visit including all aspects of the medical decision making. Resident and/or ACP note reviewed. I agree on the plan of care except where noted.    See MDM

## 2022-08-01 VITALS
HEART RATE: 66 BPM | OXYGEN SATURATION: 98 % | SYSTOLIC BLOOD PRESSURE: 144 MMHG | RESPIRATION RATE: 17 BRPM | TEMPERATURE: 98 F | DIASTOLIC BLOOD PRESSURE: 88 MMHG

## 2022-08-30 ENCOUNTER — APPOINTMENT (OUTPATIENT)
Dept: RHEUMATOLOGY | Facility: CLINIC | Age: 62
End: 2022-08-30

## 2022-08-30 VITALS
BODY MASS INDEX: 29.16 KG/M2 | DIASTOLIC BLOOD PRESSURE: 81 MMHG | TEMPERATURE: 98.1 F | SYSTOLIC BLOOD PRESSURE: 134 MMHG | WEIGHT: 175 LBS | OXYGEN SATURATION: 96 % | HEART RATE: 88 BPM | HEIGHT: 65 IN

## 2022-08-30 PROCEDURE — 99214 OFFICE O/P EST MOD 30 MIN: CPT | Mod: GC

## 2022-09-07 LAB
ALBUMIN SERPL ELPH-MCNC: 4.4 G/DL
ALP BLD-CCNC: 80 U/L
ALT SERPL-CCNC: 16 U/L
ANION GAP SERPL CALC-SCNC: 12 MMOL/L
AST SERPL-CCNC: 17 U/L
BASOPHILS # BLD AUTO: 0.03 K/UL
BASOPHILS NFR BLD AUTO: 0.5 %
BILIRUB SERPL-MCNC: 0.3 MG/DL
BUN SERPL-MCNC: 19 MG/DL
CALCIUM SERPL-MCNC: 9.7 MG/DL
CHLORIDE SERPL-SCNC: 106 MMOL/L
CO2 SERPL-SCNC: 26 MMOL/L
CREAT SERPL-MCNC: 0.6 MG/DL
CRP SERPL-MCNC: <3 MG/L
EGFR: 101 ML/MIN/1.73M2
EOSINOPHIL # BLD AUTO: 0.09 K/UL
EOSINOPHIL NFR BLD AUTO: 1.4 %
ERYTHROCYTE [SEDIMENTATION RATE] IN BLOOD BY WESTERGREN METHOD: 33 MM/HR
GLUCOSE SERPL-MCNC: 123 MG/DL
HCT VFR BLD CALC: 39.4 %
HGB BLD-MCNC: 12.6 G/DL
IMM GRANULOCYTES NFR BLD AUTO: 0.2 %
LYMPHOCYTES # BLD AUTO: 1.79 K/UL
LYMPHOCYTES NFR BLD AUTO: 26.9 %
MAN DIFF?: NORMAL
MCHC RBC-ENTMCNC: 31.2 PG
MCHC RBC-ENTMCNC: 32 GM/DL
MCV RBC AUTO: 97.5 FL
MONOCYTES # BLD AUTO: 0.42 K/UL
MONOCYTES NFR BLD AUTO: 6.3 %
NEUTROPHILS # BLD AUTO: 4.32 K/UL
NEUTROPHILS NFR BLD AUTO: 64.7 %
PLATELET # BLD AUTO: 340 K/UL
POTASSIUM SERPL-SCNC: 4.3 MMOL/L
PROT SERPL-MCNC: 7.6 G/DL
RBC # BLD: 4.04 M/UL
RBC # FLD: 14.4 %
SODIUM SERPL-SCNC: 144 MMOL/L
WBC # FLD AUTO: 6.66 K/UL

## 2022-11-28 ENCOUNTER — APPOINTMENT (OUTPATIENT)
Dept: RHEUMATOLOGY | Facility: CLINIC | Age: 62
End: 2022-11-28

## 2022-11-28 VITALS
BODY MASS INDEX: 29.16 KG/M2 | DIASTOLIC BLOOD PRESSURE: 70 MMHG | SYSTOLIC BLOOD PRESSURE: 153 MMHG | OXYGEN SATURATION: 98 % | HEART RATE: 65 BPM | HEIGHT: 65 IN | WEIGHT: 175 LBS | RESPIRATION RATE: 16 BRPM | TEMPERATURE: 97.6 F

## 2022-11-28 PROCEDURE — 90686 IIV4 VACC NO PRSV 0.5 ML IM: CPT

## 2022-11-28 PROCEDURE — G0008: CPT

## 2022-11-28 PROCEDURE — 99214 OFFICE O/P EST MOD 30 MIN: CPT | Mod: 25

## 2022-11-28 NOTE — PHYSICAL EXAM
[General Appearance - Alert] : alert [General Appearance - In No Acute Distress] : in no acute distress [Auscultation Breath Sounds / Voice Sounds] : lungs were clear to auscultation bilaterally [Heart Sounds] : normal S1 and S2 [Musculoskeletal - Swelling] : no joint swelling seen [Impaired Insight] : insight and judgment were intact

## 2022-11-28 NOTE — HISTORY OF PRESENT ILLNESS
[de-identified] : At today's visit, [FreeTextEntry1] : Taking methotrexate 8 pills/week, daily folic acid\par Reports feeling great overall, no reported joint pain or joint swelling or morning stiffness\par She is currently taking care of her mother who is in rehab\par She noticed few days ago dry cough, took Tylenol but has no fevers

## 2022-11-28 NOTE — ASSESSMENT
[FreeTextEntry1] : #Seropositive erosive RA\par - symptoms present for over 10 years but declined therapy\par - CCP >250, \par - ESR/CRP elevated\par - X-rays hands/wrists 4/9/21: erosions right 4th/left fifth PIP, left 1st MC\par - X-rays feet/ankles 4/9/21: erosions bilateral 5th and right 3rd/4th MTP heads, bilateral 5th PP\par ~ 4/20/21: started prednisone bridge therapy and MTX\par Repeat X-rays hands/feet Jan 2022: no new erosions \par Today CDAI in remission\par \par Recommend:\par -Given the erosive nature of her disease she would benefit from stepping up therapy and introducing a biologic\par Discussed the different types of medications, printed information from [facility] was provided at last visit \par At this time, patient remains hesitant and would like to hold off\par \par -continue MTX 20 mg /week \par -continue daily folic acid \par -Obtain monitoring blood work today\par -Check disease activity markers today\par \par #Remote history of smoking quit 30 years ago, new cough\par -Obtain chest x-ray\par -We will follow with CT chest to rule out associated ILD\par \par #Screening tests:\par hep panel negative 4/2022\par QTB negative 5/2022 \par \par #Vaccination:\par -FLu received today\par -Received PCV 13 with PMD- advised patient to provide documentation \par -COVID 19 Pfizer 2/27 and 3/20 , booster received November 2021- due for second booster dose\par \par #Bone health\par .normal Vitamin D \par .DEXA scan 5/14/21: lowest T score -1.8 femoral neck\par \par #TAYA 1:80\par repeat negative\par \par RTO in 3 months

## 2022-11-29 LAB
ALBUMIN SERPL ELPH-MCNC: 4.3 G/DL
ALP BLD-CCNC: 79 U/L
ALT SERPL-CCNC: 24 U/L
ANION GAP SERPL CALC-SCNC: 16 MMOL/L
AST SERPL-CCNC: 21 U/L
BASOPHILS # BLD AUTO: 0.03 K/UL
BASOPHILS NFR BLD AUTO: 0.4 %
BILIRUB SERPL-MCNC: 0.2 MG/DL
BUN SERPL-MCNC: 17 MG/DL
CALCIUM SERPL-MCNC: 10.2 MG/DL
CHLORIDE SERPL-SCNC: 104 MMOL/L
CO2 SERPL-SCNC: 25 MMOL/L
CREAT SERPL-MCNC: 0.65 MG/DL
CRP SERPL-MCNC: 4 MG/L
EGFR: 99 ML/MIN/1.73M2
EOSINOPHIL # BLD AUTO: 0.1 K/UL
EOSINOPHIL NFR BLD AUTO: 1.4 %
ERYTHROCYTE [SEDIMENTATION RATE] IN BLOOD BY WESTERGREN METHOD: 18 MM/HR
GLUCOSE SERPL-MCNC: 80 MG/DL
HCT VFR BLD CALC: 40.9 %
HGB BLD-MCNC: 13.4 G/DL
IMM GRANULOCYTES NFR BLD AUTO: 0.4 %
LYMPHOCYTES # BLD AUTO: 2.19 K/UL
LYMPHOCYTES NFR BLD AUTO: 30 %
MAN DIFF?: NORMAL
MCHC RBC-ENTMCNC: 30.7 PG
MCHC RBC-ENTMCNC: 32.8 GM/DL
MCV RBC AUTO: 93.8 FL
MONOCYTES # BLD AUTO: 0.49 K/UL
MONOCYTES NFR BLD AUTO: 6.7 %
NEUTROPHILS # BLD AUTO: 4.47 K/UL
NEUTROPHILS NFR BLD AUTO: 61.1 %
PLATELET # BLD AUTO: 363 K/UL
POTASSIUM SERPL-SCNC: 4.6 MMOL/L
PROT SERPL-MCNC: 7.6 G/DL
RBC # BLD: 4.36 M/UL
RBC # FLD: 13.4 %
SODIUM SERPL-SCNC: 144 MMOL/L
WBC # FLD AUTO: 7.31 K/UL

## 2022-12-06 ENCOUNTER — OUTPATIENT (OUTPATIENT)
Dept: OUTPATIENT SERVICES | Facility: HOSPITAL | Age: 62
LOS: 1 days | End: 2022-12-06
Payer: COMMERCIAL

## 2022-12-06 ENCOUNTER — APPOINTMENT (OUTPATIENT)
Dept: RADIOLOGY | Facility: CLINIC | Age: 62
End: 2022-12-06

## 2022-12-06 DIAGNOSIS — R05.9 COUGH, UNSPECIFIED: ICD-10-CM

## 2022-12-06 PROCEDURE — 71046 X-RAY EXAM CHEST 2 VIEWS: CPT

## 2022-12-06 PROCEDURE — 71046 X-RAY EXAM CHEST 2 VIEWS: CPT | Mod: 26

## 2022-12-07 ENCOUNTER — NON-APPOINTMENT (OUTPATIENT)
Age: 62
End: 2022-12-07

## 2023-03-20 ENCOUNTER — APPOINTMENT (OUTPATIENT)
Dept: RHEUMATOLOGY | Facility: CLINIC | Age: 63
End: 2023-03-20
Payer: COMMERCIAL

## 2023-03-20 VITALS
HEART RATE: 78 BPM | TEMPERATURE: 98.3 F | WEIGHT: 162 LBS | DIASTOLIC BLOOD PRESSURE: 72 MMHG | HEIGHT: 65 IN | SYSTOLIC BLOOD PRESSURE: 124 MMHG | RESPIRATION RATE: 16 BRPM | BODY MASS INDEX: 26.99 KG/M2 | OXYGEN SATURATION: 97 %

## 2023-03-20 DIAGNOSIS — Z00.00 ENCOUNTER FOR GENERAL ADULT MEDICAL EXAMINATION W/OUT ABNORMAL FINDINGS: ICD-10-CM

## 2023-03-20 PROCEDURE — 99214 OFFICE O/P EST MOD 30 MIN: CPT

## 2023-03-20 NOTE — PHYSICAL EXAM
[General Appearance - Alert] : alert [General Appearance - In No Acute Distress] : in no acute distress [Auscultation Breath Sounds / Voice Sounds] : lungs were clear to auscultation bilaterally [Heart Sounds] : normal S1 and S2 [FreeTextEntry1] : Chronic mild swelling at right wrist and second/third MCP, nontender [Impaired Insight] : insight and judgment were intact

## 2023-03-20 NOTE — ASSESSMENT
[FreeTextEntry1] : #Seropositive erosive RA\par - symptoms present for over 10 years but declined therapy\par - CCP >250, \par - ESR/CRP elevated\par - X-rays hands/wrists 4/9/21: erosions right 4th/left fifth PIP, left 1st MC\par - X-rays feet/ankles 4/9/21: erosions bilateral 5th and right 3rd/4th MTP heads, bilateral 5th PP\par ~ 4/20/21: started prednisone bridge therapy and MTX\par Repeat X-rays hands/feet Jan 2022: no new erosions \par ~  low disease activity\par \par Recommend:\par -Given the erosive nature of her disease she would benefit from stepping up therapy and introducing a biologic\par Discussed the different types of medications, printed information from [facility] was provided in the past, patient declined\par \par -continue MTX 20 mg /week \par -continue daily folic acid \par -Obtain monitoring blood work today\par -Check disease activity markers today\par \par #Remote history of smoking quit 30 years ago, transient cough now resolved and denies any respiratory symptoms\par Chest x-ray negative\par Discussed with patient if recurrence with pursue CT chest to rule out associated ILD\par \par #Discussed with patient cardiovascular risk associated with RA\par Given her underlying diabetes and hypertension she is also at higher risk, to discuss this with PMD in terms of cardiology referral and or additional work-up for screening/risk assessment\par \par #Screening tests:\par hep panel negative 4/2022\par QTB negative 5/2022 \par Obtain repeat today\par \par #Vaccination:\par -FLu received 2022\par -Received PCV 13 with PMD\par -COVID 19 Pfizer 2/27 and 3/20 , booster received November 2021\par \par #Bone health\par .normal Vitamin D \par .DEXA scan 5/14/21: lowest T score -1.8 femoral neck\par DEXA scan will be due this May, referral provided\par \par #TAYA 1:80\par repeat negative\par \par RTO in 4 months or earlier if needed

## 2023-03-20 NOTE — HISTORY OF PRESENT ILLNESS
[de-identified] : At today's visit, [FreeTextEntry1] : Taking methotrexate 8 pills/week, daily folic acid\par Reports feeling great overall, no reported joint pain or joint swelling or morning stiffness\par Denies any chest pain, shortness of breath or cough

## 2023-03-21 LAB
ALBUMIN SERPL ELPH-MCNC: 4.5 G/DL
ALP BLD-CCNC: 69 U/L
ALT SERPL-CCNC: 23 U/L
ANION GAP SERPL CALC-SCNC: 16 MMOL/L
AST SERPL-CCNC: 21 U/L
BASOPHILS # BLD AUTO: 0.03 K/UL
BASOPHILS NFR BLD AUTO: 0.4 %
BILIRUB SERPL-MCNC: 0.2 MG/DL
BUN SERPL-MCNC: 19 MG/DL
CALCIUM SERPL-MCNC: 10.1 MG/DL
CHLORIDE SERPL-SCNC: 104 MMOL/L
CO2 SERPL-SCNC: 22 MMOL/L
CREAT SERPL-MCNC: 0.68 MG/DL
CRP SERPL-MCNC: <3 MG/L
EGFR: 98 ML/MIN/1.73M2
EOSINOPHIL # BLD AUTO: 0.12 K/UL
EOSINOPHIL NFR BLD AUTO: 1.7 %
ERYTHROCYTE [SEDIMENTATION RATE] IN BLOOD BY WESTERGREN METHOD: 15 MM/HR
HBV CORE IGG+IGM SER QL: NONREACTIVE
HBV SURFACE AG SER QL: NONREACTIVE
HCT VFR BLD CALC: 40.8 %
HCV AB SER QL: NONREACTIVE
HCV S/CO RATIO: 0.12 S/CO
HGB BLD-MCNC: 13.1 G/DL
IMM GRANULOCYTES NFR BLD AUTO: 0.3 %
LYMPHOCYTES # BLD AUTO: 1.84 K/UL
LYMPHOCYTES NFR BLD AUTO: 26.6 %
MAN DIFF?: NORMAL
MCHC RBC-ENTMCNC: 30.5 PG
MCHC RBC-ENTMCNC: 32.1 GM/DL
MCV RBC AUTO: 94.9 FL
MONOCYTES # BLD AUTO: 0.34 K/UL
MONOCYTES NFR BLD AUTO: 4.9 %
NEUTROPHILS # BLD AUTO: 4.57 K/UL
NEUTROPHILS NFR BLD AUTO: 66.1 %
PLATELET # BLD AUTO: 327 K/UL
POTASSIUM SERPL-SCNC: 4.5 MMOL/L
PROT SERPL-MCNC: 7.6 G/DL
RBC # BLD: 4.3 M/UL
RBC # FLD: 13.7 %
SODIUM SERPL-SCNC: 142 MMOL/L
WBC # FLD AUTO: 6.92 K/UL

## 2023-03-23 LAB
M TB IFN-G BLD-IMP: NEGATIVE
QUANTIFERON TB PLUS MITOGEN MINUS NIL: 4.86 IU/ML
QUANTIFERON TB PLUS NIL: 0.05 IU/ML
QUANTIFERON TB PLUS TB1 MINUS NIL: 0.03 IU/ML
QUANTIFERON TB PLUS TB2 MINUS NIL: 0.03 IU/ML

## 2023-06-22 ENCOUNTER — OUTPATIENT (OUTPATIENT)
Dept: OUTPATIENT SERVICES | Facility: HOSPITAL | Age: 63
LOS: 1 days | End: 2023-06-22
Payer: COMMERCIAL

## 2023-06-22 ENCOUNTER — APPOINTMENT (OUTPATIENT)
Dept: RADIOLOGY | Facility: IMAGING CENTER | Age: 63
End: 2023-06-22
Payer: COMMERCIAL

## 2023-06-22 DIAGNOSIS — Z00.8 ENCOUNTER FOR OTHER GENERAL EXAMINATION: ICD-10-CM

## 2023-06-22 PROCEDURE — 77085 DXA BONE DENSITY AXL VRT FX: CPT

## 2023-06-22 PROCEDURE — 77085 DXA BONE DENSITY AXL VRT FX: CPT | Mod: 26

## 2023-07-17 ENCOUNTER — APPOINTMENT (OUTPATIENT)
Dept: RHEUMATOLOGY | Facility: CLINIC | Age: 63
End: 2023-07-17
Payer: COMMERCIAL

## 2023-07-17 VITALS
HEIGHT: 65 IN | HEART RATE: 78 BPM | RESPIRATION RATE: 16 BRPM | OXYGEN SATURATION: 98 % | DIASTOLIC BLOOD PRESSURE: 73 MMHG | WEIGHT: 162 LBS | BODY MASS INDEX: 26.99 KG/M2 | TEMPERATURE: 97 F | SYSTOLIC BLOOD PRESSURE: 133 MMHG

## 2023-07-17 DIAGNOSIS — R05.9 COUGH, UNSPECIFIED: ICD-10-CM

## 2023-07-17 PROCEDURE — 99214 OFFICE O/P EST MOD 30 MIN: CPT

## 2023-07-17 NOTE — HISTORY OF PRESENT ILLNESS
[de-identified] : at today's visit  [FreeTextEntry1] : doing well overall \par no new complaints\par no respiratory symptoms at this time\par

## 2023-07-17 NOTE — ASSESSMENT
[FreeTextEntry1] : #Seropositive erosive RA\par - symptoms present for over 10 years but declined therapy\par - CCP >250, \par - ESR/CRP elevated\par - X-rays hands/wrists 4/9/21: erosions right 4th/left fifth PIP, left 1st MC\par - X-rays feet/ankles 4/9/21: erosions bilateral 5th and right 3rd/4th MTP heads, bilateral 5th PP\par ~ 4/20/21: started prednisone bridge therapy and MTX\par Repeat X-rays hands/feet Jan 2022: no new erosions \par ~In remission today \par \par Recommend:\par -Given the erosive nature of her disease she would benefit from stepping up therapy and introducing a biologic\par Discussed the different types of medications, printed information from [facility] was provided in the past, patient declined\par \par -continue MTX 20 mg /week \par -continue daily folic acid \par -Obtain monitoring blood work today\par -Check disease activity markers today\par \par #Remote history of smoking quit 30 years ago, transient cough now resolved and denies any respiratory symptoms\par Chest x-ray negative\par Discussed with patient if recurrence with pursue CT chest to rule out associated ILD\par Obtain baseline PFTs \par \par #Discussed with patient cardiovascular risk associated with RA\par Given her underlying diabetes and hypertension she is also at higher risk, to discuss this with PMD in terms of cardiology referral and or additional work-up for screening/risk assessment\par \par #Screening tests:\par hep panel negative and QTB negative March 2023\par \par \par #Vaccination:\par -FLu received 2022\par -Received PCV 13 with PMD\par -COVID 19 Pfizer 2/27 and 3/20 , booster received November 2021\par \par #Bone health\par .normal Vitamin D \par .DEXA scan 5/14/21: lowest T score -1.8 femoral neck\par DEXA scan 2023 reviewed today: T score -1.6/low FRAX- no indication for therapy at this time \par \par #TAYA 1:80\par repeat negative\par \par RTO in 4-5 months or earlier if needed. \par \par

## 2023-11-06 ENCOUNTER — APPOINTMENT (OUTPATIENT)
Dept: RHEUMATOLOGY | Facility: CLINIC | Age: 63
End: 2023-11-06
Payer: COMMERCIAL

## 2023-11-06 VITALS
HEIGHT: 65 IN | HEART RATE: 79 BPM | WEIGHT: 162 LBS | BODY MASS INDEX: 26.99 KG/M2 | TEMPERATURE: 97.1 F | RESPIRATION RATE: 16 BRPM | OXYGEN SATURATION: 98 % | DIASTOLIC BLOOD PRESSURE: 80 MMHG | SYSTOLIC BLOOD PRESSURE: 139 MMHG

## 2023-11-06 PROCEDURE — 99214 OFFICE O/P EST MOD 30 MIN: CPT

## 2023-11-08 LAB
ALBUMIN SERPL ELPH-MCNC: 4.3 G/DL
ALP BLD-CCNC: 74 U/L
ALT SERPL-CCNC: 19 U/L
ANION GAP SERPL CALC-SCNC: 12 MMOL/L
AST SERPL-CCNC: 17 U/L
BASOPHILS # BLD AUTO: 0.02 K/UL
BASOPHILS NFR BLD AUTO: 0.3 %
BILIRUB SERPL-MCNC: 0.3 MG/DL
BUN SERPL-MCNC: 22 MG/DL
CALCIUM SERPL-MCNC: 10 MG/DL
CHLORIDE SERPL-SCNC: 100 MMOL/L
CO2 SERPL-SCNC: 27 MMOL/L
CREAT SERPL-MCNC: 0.57 MG/DL
CRP SERPL-MCNC: <3 MG/L
EGFR: 101 ML/MIN/1.73M2
EOSINOPHIL # BLD AUTO: 0.08 K/UL
EOSINOPHIL NFR BLD AUTO: 1 %
ERYTHROCYTE [SEDIMENTATION RATE] IN BLOOD BY WESTERGREN METHOD: 10 MM/HR
HCT VFR BLD CALC: 39 %
HGB BLD-MCNC: 12.7 G/DL
IMM GRANULOCYTES NFR BLD AUTO: 0.3 %
LYMPHOCYTES # BLD AUTO: 2.22 K/UL
LYMPHOCYTES NFR BLD AUTO: 28.5 %
MAN DIFF?: NORMAL
MCHC RBC-ENTMCNC: 30.2 PG
MCHC RBC-ENTMCNC: 32.6 GM/DL
MCV RBC AUTO: 92.6 FL
MONOCYTES # BLD AUTO: 0.43 K/UL
MONOCYTES NFR BLD AUTO: 5.5 %
NEUTROPHILS # BLD AUTO: 5.01 K/UL
NEUTROPHILS NFR BLD AUTO: 64.4 %
PLATELET # BLD AUTO: 308 K/UL
POTASSIUM SERPL-SCNC: 4.7 MMOL/L
PROT SERPL-MCNC: 7.8 G/DL
RBC # BLD: 4.21 M/UL
RBC # FLD: 13.4 %
SODIUM SERPL-SCNC: 139 MMOL/L
WBC # FLD AUTO: 7.78 K/UL

## 2024-03-04 ENCOUNTER — APPOINTMENT (OUTPATIENT)
Dept: RHEUMATOLOGY | Facility: CLINIC | Age: 64
End: 2024-03-04
Payer: COMMERCIAL

## 2024-03-04 VITALS
OXYGEN SATURATION: 95 % | HEART RATE: 81 BPM | BODY MASS INDEX: 26.99 KG/M2 | HEIGHT: 65 IN | DIASTOLIC BLOOD PRESSURE: 84 MMHG | WEIGHT: 162 LBS | SYSTOLIC BLOOD PRESSURE: 151 MMHG

## 2024-03-04 DIAGNOSIS — Z79.899 OTHER LONG TERM (CURRENT) DRUG THERAPY: ICD-10-CM

## 2024-03-04 DIAGNOSIS — M05.80 OTHER RHEUMATOID ARTHRITIS WITH RHEUMATOID FACTOR OF UNSPECIFIED SITE: ICD-10-CM

## 2024-03-04 PROCEDURE — 99214 OFFICE O/P EST MOD 30 MIN: CPT

## 2024-03-04 RX ORDER — METHOTREXATE 2.5 MG/1
2.5 TABLET ORAL
Qty: 96 | Refills: 0 | Status: ACTIVE | COMMUNITY
Start: 2021-04-20 | End: 1900-01-01

## 2024-03-04 RX ORDER — FOLIC ACID 1 MG
1 TABLET ORAL DAILY
Qty: 90 | Refills: 3 | Status: ACTIVE | COMMUNITY
Start: 2021-04-20 | End: 1900-01-01

## 2024-03-04 NOTE — HISTORY OF PRESENT ILLNESS
[de-identified] : at today's visit [FreeTextEntry1] : -from RA perspective, reports feeling well no pain or swelling of joints, no morning stiffness

## 2024-03-04 NOTE — PHYSICAL EXAM
[General Appearance - Alert] : alert [General Appearance - In No Acute Distress] : in no acute distress [Auscultation Breath Sounds / Voice Sounds] : lungs were clear to auscultation bilaterally [Heart Sounds] : normal S1 and S2 [Impaired Insight] : insight and judgment were intact [Musculoskeletal - Swelling] : no joint swelling seen

## 2024-03-04 NOTE — ASSESSMENT
[FreeTextEntry1] :  #Seropositive erosive RA  - symptoms present for over 10 years but declined therapy  - CCP >250,   - ESR/CRP elevated  - X-rays hands/wrists 4/9/21: erosions right 4th/left fifth PIP, left 1st MC  - X-rays feet/ankles 4/9/21: erosions bilateral 5th and right 3rd/4th MTP heads, bilateral 5th PP  ~ 4/20/21: started prednisone bridge therapy and MTX  Repeat X-rays hands/feet Jan 2022: no new erosions  ~In low disease activity today    Recommend:  -Given the erosive nature of her disease she would benefit from stepping up therapy and introducing a biologic  Discussed the different types of medications, printed information from A[facility] was provided in the past, patient declined   -continue MTX 20 mg /week  -continue daily folic acid  -Obtain monitoring blood work today  -Check disease activity markers today    #Remote history of smoking quit 30 years ago, transient cough now resolved and denies any respiratory symptoms  Chest x-ray negative  Discussed with patient if recurrence with pursue CT chest to rule out associated ILD  Obtain baseline PFTs    #Discussed with patient cardiovascular risk associated with RA  Given her underlying diabetes and hypertension she is also at higher risk, to discuss this with PMD in terms of cardiology referral and or additional work-up for screening/risk assessment    #Screening tests:  hep panel negative and QTB negative March 2023- repeat today       #Vaccination:  -FLu received 2023  -Received PCV 13 with PMD  -COVID 19 Pfizer 2/27 and 3/20 , booster received November 2021    #Bone health  .normal Vitamin D  .DEXA scan 5/14/21: lowest T score -1.8 femoral neck  DEXA scan 2023 reviewed today: T score -1.6/low FRAX- no indication for therapy at this time    #TAYA 1:80  repeat negative     RTO in 4-5 months or earlier if needed.

## 2024-03-05 LAB
ALBUMIN SERPL ELPH-MCNC: 4.2 G/DL
ALP BLD-CCNC: 65 U/L
ALT SERPL-CCNC: 18 U/L
ANION GAP SERPL CALC-SCNC: 13 MMOL/L
AST SERPL-CCNC: 19 U/L
BASOPHILS # BLD AUTO: 0.03 K/UL
BASOPHILS NFR BLD AUTO: 0.4 %
BILIRUB SERPL-MCNC: 0.3 MG/DL
BUN SERPL-MCNC: 23 MG/DL
CALCIUM SERPL-MCNC: 9.7 MG/DL
CHLORIDE SERPL-SCNC: 105 MMOL/L
CO2 SERPL-SCNC: 25 MMOL/L
CREAT SERPL-MCNC: 0.58 MG/DL
CRP SERPL-MCNC: <3 MG/L
EGFR: 101 ML/MIN/1.73M2
EOSINOPHIL # BLD AUTO: 0.07 K/UL
EOSINOPHIL NFR BLD AUTO: 0.9 %
ERYTHROCYTE [SEDIMENTATION RATE] IN BLOOD BY WESTERGREN METHOD: 7 MM/HR
HBV CORE IGG+IGM SER QL: NONREACTIVE
HBV SURFACE AG SER QL: NONREACTIVE
HCT VFR BLD CALC: 38.7 %
HCV AB SER QL: NONREACTIVE
HCV S/CO RATIO: 0.07 S/CO
HGB BLD-MCNC: 12.4 G/DL
IMM GRANULOCYTES NFR BLD AUTO: 0.4 %
LYMPHOCYTES # BLD AUTO: 1.88 K/UL
LYMPHOCYTES NFR BLD AUTO: 23.6 %
MAN DIFF?: NORMAL
MCHC RBC-ENTMCNC: 30.7 PG
MCHC RBC-ENTMCNC: 32 GM/DL
MCV RBC AUTO: 95.8 FL
MONOCYTES # BLD AUTO: 0.3 K/UL
MONOCYTES NFR BLD AUTO: 3.8 %
NEUTROPHILS # BLD AUTO: 5.67 K/UL
NEUTROPHILS NFR BLD AUTO: 70.9 %
PLATELET # BLD AUTO: 318 K/UL
POTASSIUM SERPL-SCNC: 4.4 MMOL/L
PROT SERPL-MCNC: 7.5 G/DL
RBC # BLD: 4.04 M/UL
RBC # FLD: 14.1 %
SODIUM SERPL-SCNC: 143 MMOL/L
WBC # FLD AUTO: 7.98 K/UL

## 2024-03-07 LAB
M TB IFN-G BLD-IMP: NEGATIVE
QUANTIFERON TB PLUS MITOGEN MINUS NIL: 2.12 IU/ML
QUANTIFERON TB PLUS NIL: 0.09 IU/ML
QUANTIFERON TB PLUS TB1 MINUS NIL: 0.29 IU/ML
QUANTIFERON TB PLUS TB2 MINUS NIL: 0.22 IU/ML

## 2024-08-06 ENCOUNTER — APPOINTMENT (OUTPATIENT)
Dept: RHEUMATOLOGY | Facility: CLINIC | Age: 64
End: 2024-08-06

## 2024-08-06 PROCEDURE — G2211 COMPLEX E/M VISIT ADD ON: CPT

## 2024-08-06 PROCEDURE — 99214 OFFICE O/P EST MOD 30 MIN: CPT

## 2024-08-06 NOTE — DATA REVIEWED
[FreeTextEntry1] : Labs, imaging and chart notes reviewed today with patient normal labs osteopenia on last DEXA

## 2024-08-06 NOTE — ASSESSMENT
[FreeTextEntry1] : #Seropositive erosive RA  - symptoms present for over 10 years but declined therapy  - CCP >250,   - ESR/CRP elevated  - X-rays hands/wrists 4/9/21: erosions right 4th/left fifth PIP, left 1st MC  - X-rays feet/ankles 4/9/21: erosions bilateral 5th and right 3rd/4th MTP heads, bilateral 5th PP  ~ 4/20/21: started prednisone bridge therapy and MTX  Repeat X-rays hands/feet Jan 2022: no new erosions  ~In remission     Recommend:  -Given the erosive nature of her disease she would benefit from stepping up therapy and introducing a biologic  Discussed the different types of medications, printed information from [facility] was provided in the past, patient declined   -continue MTX 20 mg /week  -continue daily folic acid  -Obtain monitoring blood work today  -Check disease activity markers today    #Remote history of smoking quit 30 years ago, transient cough now resolved and denies any respiratory symptoms  Chest x-ray negative  Discussed with patient if recurrence with pursue CT chest to rule out associated ILD  Obtain baseline PFTs    #Discussed with patient cardiovascular risk associated with RA  Given her underlying diabetes and hypertension she is also at higher risk, to discuss this with PMD in terms of cardiology referral and or additional work-up for screening/risk assessment    #Screening tests:  hep panel negative and QTB negative March 2024- repeat today      #Vaccination:  -FLu received 2023  -Received PCV 13 with PMD  -COVID 19 Pfizer 2/27 and 3/20 , booster received November 2021    #Bone health  .normal Vitamin D  .DEXA scan 5/14/21: lowest T score -1.8 femoral neck  DEXA scan 2023 reviewed today: T score -1.6/low FRAX- no indication for therapy at this time    #TAYA 1:80  repeat negative     RTO in 4-5 months or earlier if needed.

## 2024-08-06 NOTE — HISTORY OF PRESENT ILLNESS
[FreeTextEntry1] :  -from RA perspective, reports feeling well no pain or swelling of joints, no morning stiffness [de-identified] :  At today's visit.

## 2024-08-16 NOTE — ED ADULT NURSE NOTE - ABDOMEN
ischemic attack) 04/12/2022   • Wears glasses        Past Surgical History:        Procedure Laterality Date   • CAROTID ENDARTERECTOMY Right 06/07/2021    RIGHT SIDED CAROTID ENDARTERECTOMY performed by Merritt Arias MD at [facility] OR   • COLONOSCOPY  2011   • CYST REMOVAL     • ESOPHAGOGASTRIC FUNDOPLICATION     • EXTERNAL EAR SURGERY      cancer spots   • EYE SURGERY Left     for strabsismus   • KNEE ARTHROPLASTY  2018   • KNEE ARTHROSCOPY Right 01/07/2020    EXAM UNDER ANESTHESIA VIDEO ARTHROSCOPY RIGHT KNEE, PARTIAL MEDIAL MENISCECTOMY, PATELLA FEMORAL CHONDROPLASTY, SYNOVECTOMY performed by Caden Veliz MD at [facility] OR   • STRABISMUS SURGERY     • TONSILLECTOMY     • UPPER GASTROINTESTINAL ENDOSCOPY  2011   • UPPER GASTROINTESTINAL ENDOSCOPY  09/26/2014    gastritis barretts biopsy taken   • WRIST SURGERY Right 7/3/2024    OPEN REDUCTION INTERNAL FIXATION RIGHT DISTAL RADIUS FRACTURE, RIGHT CARPAL TUNNEL RELEASE performed by Elton Glass MD at [facility] OR       Social History:    Social History     Tobacco Use   • Smoking status: Never   • Smokeless tobacco: Never   Substance Use Topics   • Alcohol use: Not Currently     Alcohol/week: 10.0 standard drinks of alcohol     Types: 4 Glasses of wine, 6 Drinks containing 0.5 oz of alcohol per week                                Counseling given: Not Answered      Vital Signs (Current):   Vitals:    08/13/24 1358 08/16/24 0830   BP:  (!) 142/83   Pulse:  71   Resp:  16   Temp:  97.8 °F (36.6 °C)   TempSrc:  Oral   SpO2:  97%   Weight: 98.4 kg (217 lb)    Height: 1.88 m (6' 2\")                                               BP Readings from Last 3 Encounters:   08/16/24 (!) 142/83   08/12/24 (!) 100/50   08/02/24 (!) 162/70       NPO Status: Time of last liquid consumption: 0730 (sip with medication)                        Time of last solid consumption: 1900                        Date of last liquid consumption: 08/16/24                        Date of last 
soft/nondistended/nontender

## 2024-12-30 ENCOUNTER — APPOINTMENT (OUTPATIENT)
Dept: RHEUMATOLOGY | Facility: CLINIC | Age: 64
End: 2024-12-30
Payer: MEDICARE

## 2024-12-30 VITALS
SYSTOLIC BLOOD PRESSURE: 138 MMHG | HEIGHT: 65 IN | BODY MASS INDEX: 26.66 KG/M2 | DIASTOLIC BLOOD PRESSURE: 78 MMHG | RESPIRATION RATE: 16 BRPM | OXYGEN SATURATION: 98 % | TEMPERATURE: 98.1 F | WEIGHT: 160 LBS | HEART RATE: 84 BPM

## 2024-12-30 DIAGNOSIS — M05.80 OTHER RHEUMATOID ARTHRITIS WITH RHEUMATOID FACTOR OF UNSPECIFIED SITE: ICD-10-CM

## 2024-12-30 DIAGNOSIS — Z79.899 OTHER LONG TERM (CURRENT) DRUG THERAPY: ICD-10-CM

## 2024-12-30 PROCEDURE — 99204 OFFICE O/P NEW MOD 45 MIN: CPT

## 2024-12-31 LAB
ALBUMIN SERPL ELPH-MCNC: 4.3 G/DL
ALP BLD-CCNC: 81 U/L
ALT SERPL-CCNC: 28 U/L
ANION GAP SERPL CALC-SCNC: 11 MMOL/L
AST SERPL-CCNC: 24 U/L
BASOPHILS # BLD AUTO: 0.04 K/UL
BASOPHILS NFR BLD AUTO: 0.5 %
BILIRUB SERPL-MCNC: 0.3 MG/DL
BUN SERPL-MCNC: 18 MG/DL
CALCIUM SERPL-MCNC: 10.4 MG/DL
CHLORIDE SERPL-SCNC: 100 MMOL/L
CO2 SERPL-SCNC: 29 MMOL/L
CREAT SERPL-MCNC: 0.6 MG/DL
CRP SERPL-MCNC: <3 MG/L
EGFR: 100 ML/MIN/1.73M2
EOSINOPHIL # BLD AUTO: 0.11 K/UL
EOSINOPHIL NFR BLD AUTO: 1.3 %
ERYTHROCYTE [SEDIMENTATION RATE] IN BLOOD BY WESTERGREN METHOD: 7 MM/HR
HCT VFR BLD CALC: 42 %
HGB BLD-MCNC: 13 G/DL
IMM GRANULOCYTES NFR BLD AUTO: 0.4 %
LYMPHOCYTES # BLD AUTO: 1.47 K/UL
LYMPHOCYTES NFR BLD AUTO: 17.6 %
MAN DIFF?: NORMAL
MCHC RBC-ENTMCNC: 30.9 PG
MCHC RBC-ENTMCNC: 31 G/DL
MCV RBC AUTO: 99.8 FL
MONOCYTES # BLD AUTO: 0.51 K/UL
MONOCYTES NFR BLD AUTO: 6.1 %
NEUTROPHILS # BLD AUTO: 6.19 K/UL
NEUTROPHILS NFR BLD AUTO: 74.1 %
PLATELET # BLD AUTO: 336 K/UL
POTASSIUM SERPL-SCNC: 4.5 MMOL/L
PROT SERPL-MCNC: 7.5 G/DL
RBC # BLD: 4.21 M/UL
RBC # FLD: 14.5 %
SODIUM SERPL-SCNC: 141 MMOL/L
WBC # FLD AUTO: 8.35 K/UL

## 2025-04-19 ENCOUNTER — NON-APPOINTMENT (OUTPATIENT)
Age: 65
End: 2025-04-19

## 2025-04-21 ENCOUNTER — APPOINTMENT (OUTPATIENT)
Dept: RHEUMATOLOGY | Facility: CLINIC | Age: 65
End: 2025-04-21
Payer: MEDICARE

## 2025-04-21 VITALS
HEART RATE: 70 BPM | OXYGEN SATURATION: 96 % | WEIGHT: 150 LBS | BODY MASS INDEX: 24.99 KG/M2 | SYSTOLIC BLOOD PRESSURE: 157 MMHG | HEIGHT: 65 IN | DIASTOLIC BLOOD PRESSURE: 83 MMHG

## 2025-04-21 DIAGNOSIS — M05.80 OTHER RHEUMATOID ARTHRITIS WITH RHEUMATOID FACTOR OF UNSPECIFIED SITE: ICD-10-CM

## 2025-04-21 DIAGNOSIS — Z79.899 OTHER LONG TERM (CURRENT) DRUG THERAPY: ICD-10-CM

## 2025-04-21 LAB
BASOPHILS # BLD AUTO: 0.03 K/UL
BASOPHILS NFR BLD AUTO: 0.4 %
EOSINOPHIL # BLD AUTO: 0.16 K/UL
EOSINOPHIL NFR BLD AUTO: 2 %
HBV CORE IGG+IGM SER QL: NONREACTIVE
HBV SURFACE AG SER QL: NONREACTIVE
HCT VFR BLD CALC: 40.5 %
HCV AB SER QL: NONREACTIVE
HCV S/CO RATIO: 0.1 S/CO
HGB BLD-MCNC: 13 G/DL
IMM GRANULOCYTES NFR BLD AUTO: 0.3 %
LYMPHOCYTES # BLD AUTO: 1.92 K/UL
LYMPHOCYTES NFR BLD AUTO: 24 %
MAN DIFF?: NORMAL
MCHC RBC-ENTMCNC: 31.4 PG
MCHC RBC-ENTMCNC: 32.1 G/DL
MCV RBC AUTO: 97.8 FL
MONOCYTES # BLD AUTO: 0.58 K/UL
MONOCYTES NFR BLD AUTO: 7.3 %
NEUTROPHILS # BLD AUTO: 5.29 K/UL
NEUTROPHILS NFR BLD AUTO: 66 %
PLATELET # BLD AUTO: 287 K/UL
RBC # BLD: 4.14 M/UL
RBC # FLD: 13.9 %
WBC # FLD AUTO: 8 K/UL

## 2025-04-21 PROCEDURE — G2211 COMPLEX E/M VISIT ADD ON: CPT

## 2025-04-21 PROCEDURE — 99214 OFFICE O/P EST MOD 30 MIN: CPT

## 2025-04-22 LAB
ALBUMIN SERPL ELPH-MCNC: 4.4 G/DL
ALP BLD-CCNC: 95 U/L
ALT SERPL-CCNC: 65 U/L
ANION GAP SERPL CALC-SCNC: 14 MMOL/L
AST SERPL-CCNC: 37 U/L
BILIRUB SERPL-MCNC: 0.4 MG/DL
BUN SERPL-MCNC: 14 MG/DL
CALCIUM SERPL-MCNC: 9.8 MG/DL
CHLORIDE SERPL-SCNC: 101 MMOL/L
CO2 SERPL-SCNC: 25 MMOL/L
CREAT SERPL-MCNC: 0.48 MG/DL
CRP SERPL-MCNC: <3 MG/L
EGFRCR SERPLBLD CKD-EPI 2021: 105 ML/MIN/1.73M2
ERYTHROCYTE [SEDIMENTATION RATE] IN BLOOD BY WESTERGREN METHOD: 18 MM/HR
POTASSIUM SERPL-SCNC: 4.6 MMOL/L
PROT SERPL-MCNC: 7.4 G/DL
SODIUM SERPL-SCNC: 140 MMOL/L

## 2025-04-24 LAB
M TB IFN-G BLD-IMP: NEGATIVE
QUANTIFERON TB PLUS MITOGEN MINUS NIL: 2.35 IU/ML
QUANTIFERON TB PLUS NIL: 0.29 IU/ML
QUANTIFERON TB PLUS TB1 MINUS NIL: 0.27 IU/ML
QUANTIFERON TB PLUS TB2 MINUS NIL: 0.19 IU/ML

## 2025-05-02 DIAGNOSIS — R74.01 ELEVATION OF LEVELS OF LIVER TRANSAMINASE LEVELS: ICD-10-CM

## 2025-05-02 LAB
ALBUMIN SERPL ELPH-MCNC: 4.4 G/DL
ALP BLD-CCNC: 80 U/L
ALT SERPL-CCNC: 109 U/L
AST SERPL-CCNC: 73 U/L
BILIRUB DIRECT SERPL-MCNC: 0.1 MG/DL
BILIRUB INDIRECT SERPL-MCNC: 0.2 MG/DL
BILIRUB SERPL-MCNC: 0.3 MG/DL
PROT SERPL-MCNC: 7.3 G/DL

## 2025-08-28 ENCOUNTER — OUTPATIENT (OUTPATIENT)
Dept: OUTPATIENT SERVICES | Facility: HOSPITAL | Age: 65
LOS: 1 days | End: 2025-08-28
Payer: MEDICARE

## 2025-08-28 ENCOUNTER — APPOINTMENT (OUTPATIENT)
Dept: RADIOLOGY | Facility: CLINIC | Age: 65
End: 2025-08-28

## 2025-08-28 ENCOUNTER — RESULT REVIEW (OUTPATIENT)
Age: 65
End: 2025-08-28

## 2025-08-28 DIAGNOSIS — M05.80 OTHER RHEUMATOID ARTHRITIS WITH RHEUMATOID FACTOR OF UNSPECIFIED SITE: ICD-10-CM

## 2025-08-28 PROCEDURE — 73630 X-RAY EXAM OF FOOT: CPT | Mod: 26,50

## 2025-08-28 PROCEDURE — 73130 X-RAY EXAM OF HAND: CPT

## 2025-08-28 PROCEDURE — 73130 X-RAY EXAM OF HAND: CPT | Mod: 26,50

## 2025-08-28 PROCEDURE — 73630 X-RAY EXAM OF FOOT: CPT
